# Patient Record
Sex: FEMALE | Race: BLACK OR AFRICAN AMERICAN | NOT HISPANIC OR LATINO | Employment: FULL TIME | URBAN - METROPOLITAN AREA
[De-identification: names, ages, dates, MRNs, and addresses within clinical notes are randomized per-mention and may not be internally consistent; named-entity substitution may affect disease eponyms.]

---

## 2023-06-14 ENCOUNTER — HOSPITAL ENCOUNTER (OUTPATIENT)
Dept: DATA CONVERSION | Facility: HOSPITAL | Age: 63
End: 2023-06-14
Attending: INTERNAL MEDICINE
Payer: MEDICAID

## 2023-06-14 DIAGNOSIS — K64.8 OTHER HEMORRHOIDS: ICD-10-CM

## 2023-06-14 DIAGNOSIS — Z12.11 ENCOUNTER FOR SCREENING FOR MALIGNANT NEOPLASM OF COLON: ICD-10-CM

## 2023-06-14 DIAGNOSIS — K64.4 RESIDUAL HEMORRHOIDAL SKIN TAGS: ICD-10-CM

## 2023-06-14 DIAGNOSIS — K57.30 DIVERTICULOSIS OF LARGE INTESTINE WITHOUT PERFORATION OR ABSCESS WITHOUT BLEEDING: ICD-10-CM

## 2023-06-14 DIAGNOSIS — K21.9 GASTRO-ESOPHAGEAL REFLUX DISEASE WITHOUT ESOPHAGITIS: ICD-10-CM

## 2023-06-14 DIAGNOSIS — R13.10 DYSPHAGIA, UNSPECIFIED: ICD-10-CM

## 2023-08-08 PROBLEM — M79.672 LEFT FOOT PAIN: Status: ACTIVE | Noted: 2023-08-08

## 2023-08-08 PROBLEM — R13.10 ODYNOPHAGIA: Status: ACTIVE | Noted: 2023-08-08

## 2023-08-08 PROBLEM — K59.00 CONSTIPATION: Status: ACTIVE | Noted: 2023-08-08

## 2023-08-08 PROBLEM — R13.10 DYSPHAGIA: Status: ACTIVE | Noted: 2023-08-08

## 2023-08-08 PROBLEM — M77.31 CALCANEAL SPUR OF RIGHT FOOT: Status: ACTIVE | Noted: 2023-08-08

## 2023-08-08 PROBLEM — Z95.5 PRESENCE OF STENT IN LAD CORONARY ARTERY: Status: ACTIVE | Noted: 2023-08-08

## 2023-08-08 PROBLEM — B35.4 TINEA CORPORIS: Status: ACTIVE | Noted: 2023-08-08

## 2023-08-08 PROBLEM — I25.10 CAD S/P PERCUTANEOUS CORONARY ANGIOPLASTY: Status: ACTIVE | Noted: 2023-08-08

## 2023-08-08 PROBLEM — K63.5 BENIGN COLONIC POLYP: Status: ACTIVE | Noted: 2023-08-08

## 2023-08-08 PROBLEM — E78.5 HYPERLIPIDEMIA: Status: ACTIVE | Noted: 2023-08-08

## 2023-08-08 PROBLEM — K22.4 HYPERCONTRACTILE ESOPHAGUS: Status: ACTIVE | Noted: 2023-08-08

## 2023-08-08 PROBLEM — I10 HYPERTENSION: Status: ACTIVE | Noted: 2023-08-08

## 2023-08-08 PROBLEM — M89.8X3: Status: ACTIVE | Noted: 2023-08-08

## 2023-08-08 PROBLEM — K21.9 GERD (GASTROESOPHAGEAL REFLUX DISEASE): Status: ACTIVE | Noted: 2023-08-08

## 2023-08-08 PROBLEM — Z98.61 CAD S/P PERCUTANEOUS CORONARY ANGIOPLASTY: Status: ACTIVE | Noted: 2023-08-08

## 2023-08-08 PROBLEM — R17 ELEVATED BILIRUBIN: Status: ACTIVE | Noted: 2023-08-08

## 2023-08-08 PROBLEM — N63.20 LEFT BREAST LUMP: Status: ACTIVE | Noted: 2023-08-08

## 2023-08-08 PROBLEM — Z18.81 GLASS FOREIGN BODY IN SKIN: Status: ACTIVE | Noted: 2023-08-08

## 2023-08-08 PROBLEM — R63.4 WEIGHT LOSS, UNINTENTIONAL: Status: ACTIVE | Noted: 2023-08-08

## 2023-08-08 PROBLEM — K64.9 HEMORRHOIDS: Status: ACTIVE | Noted: 2023-08-08

## 2023-08-08 PROBLEM — I25.10 CAD (CORONARY ARTERY DISEASE): Status: ACTIVE | Noted: 2023-08-08

## 2023-08-08 PROBLEM — M25.531 RIGHT WRIST PAIN: Status: ACTIVE | Noted: 2023-08-08

## 2023-08-08 PROBLEM — G57.62 MORTON'S NEUROMA OF LEFT FOOT: Status: ACTIVE | Noted: 2023-08-08

## 2023-08-08 PROBLEM — T14.8XXA GLASS FOREIGN BODY IN SKIN: Status: ACTIVE | Noted: 2023-08-08

## 2023-08-08 PROBLEM — R63.0 LOSS OF APPETITE: Status: ACTIVE | Noted: 2023-08-08

## 2023-08-08 PROBLEM — E55.9 VITAMIN D DEFICIENCY: Status: ACTIVE | Noted: 2023-08-08

## 2023-08-08 PROBLEM — G47.00 INSOMNIA: Status: ACTIVE | Noted: 2023-08-08

## 2023-08-08 PROBLEM — K64.8 INFLAMED INTERNAL HEMORRHOID: Status: ACTIVE | Noted: 2023-08-08

## 2023-08-08 PROBLEM — R20.9 COLD HANDS: Status: ACTIVE | Noted: 2023-08-08

## 2023-08-08 PROBLEM — M89.8X4: Status: ACTIVE | Noted: 2023-08-08

## 2023-08-08 RX ORDER — LANSOPRAZOLE 30 MG/1
30 CAPSULE, DELAYED RELEASE ORAL EVERY MORNING
COMMUNITY
End: 2023-08-11 | Stop reason: SDUPTHER

## 2023-08-08 RX ORDER — IBUPROFEN 800 MG/1
800 TABLET ORAL EVERY 8 HOURS PRN
COMMUNITY
Start: 2022-11-21 | End: 2023-08-11 | Stop reason: ALTCHOICE

## 2023-08-08 RX ORDER — LIDOCAINE 40 MG/G
1 CREAM TOPICAL DAILY PRN
COMMUNITY
Start: 2021-10-19 | End: 2024-01-17 | Stop reason: WASHOUT

## 2023-08-08 RX ORDER — LISINOPRIL 10 MG/1
10 TABLET ORAL DAILY
COMMUNITY
End: 2023-08-11 | Stop reason: SDUPTHER

## 2023-08-08 RX ORDER — ROSUVASTATIN CALCIUM 40 MG/1
40 TABLET, COATED ORAL DAILY
COMMUNITY
End: 2023-08-11 | Stop reason: SDUPTHER

## 2023-08-08 RX ORDER — CHOLECALCIFEROL (VITAMIN D3) 50 MCG
1 TABLET ORAL DAILY
COMMUNITY
Start: 2022-02-15 | End: 2023-08-11 | Stop reason: SDUPTHER

## 2023-08-08 RX ORDER — DOCUSATE SODIUM 100 MG/1
100 CAPSULE, LIQUID FILLED ORAL 2 TIMES DAILY
COMMUNITY
Start: 2022-12-20 | End: 2024-01-17 | Stop reason: WASHOUT

## 2023-08-08 RX ORDER — CLOPIDOGREL BISULFATE 75 MG/1
75 TABLET ORAL DAILY
COMMUNITY
End: 2023-08-11 | Stop reason: SDUPTHER

## 2023-08-08 RX ORDER — MELATONIN 10 MG
1 TABLET, SUBLINGUAL SUBLINGUAL DAILY
COMMUNITY
Start: 2023-05-10 | End: 2024-01-17 | Stop reason: WASHOUT

## 2023-08-08 RX ORDER — NITROGLYCERIN 0.4 MG/1
0.4 TABLET SUBLINGUAL EVERY 5 MIN PRN
COMMUNITY
Start: 2015-03-24

## 2023-08-08 RX ORDER — AMLODIPINE BESYLATE 2.5 MG/1
2.5 TABLET ORAL DAILY
COMMUNITY
End: 2023-08-11 | Stop reason: SDUPTHER

## 2023-08-08 RX ORDER — ASPIRIN 81 MG/1
81 TABLET ORAL DAILY
COMMUNITY
End: 2023-08-11 | Stop reason: SDUPTHER

## 2023-08-08 RX ORDER — CLOTRIMAZOLE 1 %
1 CREAM (GRAM) TOPICAL 2 TIMES DAILY
COMMUNITY
Start: 2021-06-25 | End: 2024-01-17 | Stop reason: WASHOUT

## 2023-08-08 RX ORDER — HYDROCORTISONE ACETATE PRAMOXINE HCL 2.5; 1 G/100G; G/100G
1 CREAM TOPICAL 3 TIMES DAILY PRN
COMMUNITY
Start: 2019-10-02 | End: 2023-08-11 | Stop reason: SDUPTHER

## 2023-08-11 ENCOUNTER — OFFICE VISIT (OUTPATIENT)
Dept: PRIMARY CARE | Facility: CLINIC | Age: 63
End: 2023-08-11
Payer: COMMERCIAL

## 2023-08-11 VITALS — DIASTOLIC BLOOD PRESSURE: 60 MMHG | BODY MASS INDEX: 22.86 KG/M2 | SYSTOLIC BLOOD PRESSURE: 100 MMHG | WEIGHT: 125 LBS

## 2023-08-11 DIAGNOSIS — K64.9 HEMORRHOIDS, UNSPECIFIED HEMORRHOID TYPE: ICD-10-CM

## 2023-08-11 DIAGNOSIS — Z98.61 CAD S/P PERCUTANEOUS CORONARY ANGIOPLASTY: ICD-10-CM

## 2023-08-11 DIAGNOSIS — Z00.00 ANNUAL PHYSICAL EXAM: ICD-10-CM

## 2023-08-11 DIAGNOSIS — I10 HYPERTENSION, UNSPECIFIED TYPE: ICD-10-CM

## 2023-08-11 DIAGNOSIS — E55.9 VITAMIN D DEFICIENCY: ICD-10-CM

## 2023-08-11 DIAGNOSIS — Z87.891 AGGRESSIVE EX-SMOKER: ICD-10-CM

## 2023-08-11 DIAGNOSIS — Z12.31 BREAST CANCER SCREENING BY MAMMOGRAM: ICD-10-CM

## 2023-08-11 DIAGNOSIS — M81.0 AGE-RELATED OSTEOPOROSIS WITHOUT CURRENT PATHOLOGICAL FRACTURE: ICD-10-CM

## 2023-08-11 DIAGNOSIS — K22.4 HYPERCONTRACTILE ESOPHAGUS: ICD-10-CM

## 2023-08-11 DIAGNOSIS — Z12.2 SCREENING FOR LUNG CANCER: ICD-10-CM

## 2023-08-11 DIAGNOSIS — E78.5 HYPERLIPIDEMIA, UNSPECIFIED HYPERLIPIDEMIA TYPE: Primary | ICD-10-CM

## 2023-08-11 DIAGNOSIS — Z13.820 OSTEOPOROSIS SCREENING: ICD-10-CM

## 2023-08-11 DIAGNOSIS — I25.10 CAD S/P PERCUTANEOUS CORONARY ANGIOPLASTY: ICD-10-CM

## 2023-08-11 PROCEDURE — 1159F MED LIST DOCD IN RCRD: CPT | Performed by: INTERNAL MEDICINE

## 2023-08-11 PROCEDURE — 1126F AMNT PAIN NOTED NONE PRSNT: CPT | Performed by: INTERNAL MEDICINE

## 2023-08-11 PROCEDURE — G0439 PPPS, SUBSEQ VISIT: HCPCS | Performed by: INTERNAL MEDICINE

## 2023-08-11 PROCEDURE — 1170F FXNL STATUS ASSESSED: CPT | Performed by: INTERNAL MEDICINE

## 2023-08-11 PROCEDURE — 3078F DIAST BP <80 MM HG: CPT | Performed by: INTERNAL MEDICINE

## 2023-08-11 PROCEDURE — 1160F RVW MEDS BY RX/DR IN RCRD: CPT | Performed by: INTERNAL MEDICINE

## 2023-08-11 PROCEDURE — 1036F TOBACCO NON-USER: CPT | Performed by: INTERNAL MEDICINE

## 2023-08-11 PROCEDURE — 99214 OFFICE O/P EST MOD 30 MIN: CPT | Performed by: INTERNAL MEDICINE

## 2023-08-11 PROCEDURE — 3074F SYST BP LT 130 MM HG: CPT | Performed by: INTERNAL MEDICINE

## 2023-08-11 RX ORDER — LANSOPRAZOLE 30 MG/1
30 CAPSULE, DELAYED RELEASE ORAL EVERY MORNING
Qty: 90 CAPSULE | Refills: 1 | Status: SHIPPED | OUTPATIENT
Start: 2023-08-11 | End: 2023-11-29 | Stop reason: SDUPTHER

## 2023-08-11 RX ORDER — CLOPIDOGREL BISULFATE 75 MG/1
75 TABLET ORAL DAILY
Qty: 90 TABLET | Refills: 1 | Status: SHIPPED | OUTPATIENT
Start: 2023-08-11 | End: 2023-11-29 | Stop reason: SDUPTHER

## 2023-08-11 RX ORDER — CHOLECALCIFEROL (VITAMIN D3) 50 MCG
50 TABLET ORAL DAILY
Qty: 90 TABLET | Refills: 1 | Status: SHIPPED | OUTPATIENT
Start: 2023-08-11 | End: 2023-11-29 | Stop reason: SDUPTHER

## 2023-08-11 RX ORDER — HYDROCORTISONE ACETATE PRAMOXINE HCL 2.5; 1 G/100G; G/100G
1 CREAM TOPICAL 3 TIMES DAILY PRN
Qty: 1 G | Refills: 1 | Status: SHIPPED | OUTPATIENT
Start: 2023-08-11 | End: 2023-08-14 | Stop reason: SDUPTHER

## 2023-08-11 RX ORDER — ROSUVASTATIN CALCIUM 40 MG/1
40 TABLET, COATED ORAL DAILY
Qty: 90 TABLET | Refills: 1 | Status: SHIPPED | OUTPATIENT
Start: 2023-08-11 | End: 2023-11-29 | Stop reason: SDUPTHER

## 2023-08-11 RX ORDER — AMLODIPINE BESYLATE 2.5 MG/1
2.5 TABLET ORAL DAILY
Qty: 90 TABLET | Refills: 1 | Status: SHIPPED | OUTPATIENT
Start: 2023-08-11 | End: 2023-11-29 | Stop reason: SDUPTHER

## 2023-08-11 RX ORDER — ASPIRIN 81 MG/1
81 TABLET ORAL DAILY
Qty: 90 TABLET | Refills: 1 | Status: SHIPPED | OUTPATIENT
Start: 2023-08-11 | End: 2024-01-17 | Stop reason: SDUPTHER

## 2023-08-11 RX ORDER — LISINOPRIL 10 MG/1
10 TABLET ORAL DAILY
Qty: 90 TABLET | Refills: 1 | Status: SHIPPED | OUTPATIENT
Start: 2023-08-11 | End: 2023-11-29 | Stop reason: SDUPTHER

## 2023-08-11 ASSESSMENT — ENCOUNTER SYMPTOMS
APNEA: 0
ABDOMINAL DISTENTION: 0
ACTIVITY CHANGE: 0
FLANK PAIN: 0
HEADACHES: 0
WOUND: 0
DEPRESSION: 0
DIAPHORESIS: 0
WEAKNESS: 0
TREMORS: 0
SLEEP DISTURBANCE: 0
BLOOD IN STOOL: 0
NECK PAIN: 0
LOSS OF SENSATION IN FEET: 0
NAUSEA: 0
CONSTIPATION: 0
OCCASIONAL FEELINGS OF UNSTEADINESS: 0
CHILLS: 0
FATIGUE: 0
PALPITATIONS: 0
SHORTNESS OF BREATH: 0
APPETITE CHANGE: 0
UNEXPECTED WEIGHT CHANGE: 0
BACK PAIN: 0
WHEEZING: 0
VOMITING: 0
DYSURIA: 0
FEVER: 0
COUGH: 0
MYALGIAS: 0
ARTHRALGIAS: 0
JOINT SWELLING: 0
FREQUENCY: 0
NECK STIFFNESS: 0
HEMATURIA: 0
DIARRHEA: 0
ABDOMINAL PAIN: 0
DIZZINESS: 0
DIFFICULTY URINATING: 0
CHEST TIGHTNESS: 0
NERVOUS/ANXIOUS: 0

## 2023-08-11 ASSESSMENT — ACTIVITIES OF DAILY LIVING (ADL)
GROCERY_SHOPPING: INDEPENDENT
MANAGING_FINANCES: INDEPENDENT
DOING_HOUSEWORK: INDEPENDENT
TAKING_MEDICATION: INDEPENDENT
BATHING: INDEPENDENT
DRESSING: INDEPENDENT

## 2023-08-11 ASSESSMENT — PATIENT HEALTH QUESTIONNAIRE - PHQ9
1. LITTLE INTEREST OR PLEASURE IN DOING THINGS: NOT AT ALL
1. LITTLE INTEREST OR PLEASURE IN DOING THINGS: NOT AT ALL
2. FEELING DOWN, DEPRESSED OR HOPELESS: NOT AT ALL
2. FEELING DOWN, DEPRESSED OR HOPELESS: NOT AT ALL
SUM OF ALL RESPONSES TO PHQ9 QUESTIONS 1 AND 2: 0
2. FEELING DOWN, DEPRESSED OR HOPELESS: NOT AT ALL
1. LITTLE INTEREST OR PLEASURE IN DOING THINGS: NOT AT ALL

## 2023-08-11 NOTE — PROGRESS NOTES
Subjective   Patient ID: Marquise Gaona is a 63 y.o. female who presents for Follow-up and Medicare Annual Wellness Visit Subsequent.  Marquise Gaona, a 64 y/o with PMHx of HTN, HLD, CAD s/p angioplasty, Hypercontractile esophagus, Ospina's neuroma of the left foot excision and insomnia came to clinic for a wellness visit. Patient feels well overall. She denies any new concerns or complaints. She follows with Cardiology yearly. She is retired, no problems with sleep or appetite. Feels safe at home. No recent fall. Able to take her medication as prescribed. No fever, chills, recent illness, headache, chest pain, SOB, N/V diarrhea, melan hematochezia, urinary symptoms, hematuria, or leg swelling.         Review of Systems   Constitutional:  Negative for activity change, appetite change, chills, diaphoresis, fatigue, fever and unexpected weight change.   Eyes:  Negative for visual disturbance.   Respiratory:  Negative for apnea, cough, chest tightness, shortness of breath and wheezing.    Cardiovascular:  Negative for chest pain, palpitations and leg swelling.   Gastrointestinal:  Negative for abdominal distention, abdominal pain, blood in stool, constipation, diarrhea, nausea and vomiting.   Endocrine: Negative for cold intolerance and heat intolerance.   Genitourinary:  Negative for decreased urine volume, difficulty urinating, dysuria, enuresis, flank pain, frequency, hematuria and urgency.   Musculoskeletal:  Negative for arthralgias, back pain, gait problem, joint swelling, myalgias, neck pain and neck stiffness.   Skin:  Negative for rash and wound.   Neurological:  Negative for dizziness, tremors, syncope, weakness and headaches.   Psychiatric/Behavioral:  Negative for behavioral problems and sleep disturbance. The patient is not nervous/anxious.        Objective   Physical Exam  Constitutional:       General: She is not in acute distress.     Appearance: She is not ill-appearing, toxic-appearing or diaphoretic.    HENT:      Head: Normocephalic and atraumatic.      Right Ear: There is no impacted cerumen.      Left Ear: There is no impacted cerumen.      Nose: Nose normal. No congestion or rhinorrhea.      Mouth/Throat:      Mouth: Mucous membranes are moist.      Pharynx: No oropharyngeal exudate or posterior oropharyngeal erythema.   Eyes:      General:         Right eye: No discharge.         Left eye: No discharge.      Extraocular Movements: Extraocular movements intact.      Conjunctiva/sclera: Conjunctivae normal.      Pupils: Pupils are equal, round, and reactive to light.   Neck:      Vascular: No carotid bruit.   Cardiovascular:      Rate and Rhythm: Normal rate and regular rhythm.      Heart sounds: No murmur heard.     No friction rub. No gallop.   Pulmonary:      Effort: No respiratory distress.      Breath sounds: No stridor. No wheezing, rhonchi or rales.   Chest:      Chest wall: No tenderness.   Abdominal:      General: Abdomen is flat. Bowel sounds are normal. There is no distension.      Palpations: Abdomen is soft. There is no mass.      Tenderness: There is no abdominal tenderness. There is no guarding or rebound.      Hernia: No hernia is present.   Musculoskeletal:         General: No swelling, tenderness, deformity or signs of injury. Normal range of motion.      Cervical back: Normal range of motion and neck supple. No rigidity or tenderness.      Right lower leg: No edema.      Left lower leg: No edema.   Lymphadenopathy:      Cervical: No cervical adenopathy.   Skin:     Coloration: Skin is not jaundiced or pale.      Findings: No bruising, erythema, lesion or rash.   Neurological:      General: No focal deficit present.      Mental Status: She is oriented to person, place, and time. Mental status is at baseline.      Cranial Nerves: No cranial nerve deficit.      Sensory: No sensory deficit.      Motor: No weakness.      Coordination: Coordination normal.      Gait: Gait normal.      Deep Tendon  Reflexes: Reflexes normal.   Psychiatric:         Mood and Affect: Mood normal.         Behavior: Behavior normal.         Thought Content: Thought content normal.         Judgment: Judgment normal.         Assessment/Plan   Problem List Items Addressed This Visit          Cardiac and Vasculature    CAD S/P percutaneous coronary angioplasty    Relevant Medications    amLODIPine (Norvasc) 2.5 mg tablet    aspirin 81 mg EC tablet    clopidogrel (Plavix) 75 mg tablet    Other Relevant Orders    Lipid Panel    Hyperlipidemia - Primary    Relevant Medications    rosuvastatin (Crestor) 40 mg tablet    Other Relevant Orders    Lipid Panel    Hypertension    Relevant Medications    amLODIPine (Norvasc) 2.5 mg tablet    lisinopril 10 mg tablet    Other Relevant Orders    Albumin , Urine Random    CBC    Comprehensive Metabolic Panel       Endocrine/Metabolic    Vitamin D deficiency    Relevant Medications    cholecalciferol (Vitamin D-3) 50 MCG (2000 UT) tablet    Other Relevant Orders    XR DEXA bone density       Gastrointestinal and Abdominal    Hypercontractile esophagus    Relevant Medications    lansoprazole (Prevacid) 30 mg DR capsule    Hemorrhoids    Relevant Medications    hydrocortisone-pramoxine (Analpram-HC) 2.5-1 % cream    Other Relevant Orders    CBC     Other Visit Diagnoses       Breast cancer screening by mammogram        Relevant Orders    BI mammo bilateral screening tomosynthesis    Osteoporosis screening        Relevant Orders    XR DEXA bone density    Annual physical exam        Relevant Orders    CBC    Comprehensive Metabolic Panel    Hemoglobin A1C    TSH with reflex to Free T4 if abnormal    XR DEXA bone density    CT lung screening low dose    Age-related osteoporosis without current pathological fracture        Relevant Orders    XR DEXA bone density    Screening for lung cancer        Relevant Orders    CT lung screening low dose    Aggressive ex-smoker        Relevant Orders    CT lung  screening low dose             Medicare Wellness Billing Compliance Satisfied    *This is a visual tool to show completion of required items on the day of the visit. Green checks will only appear on the date of visit.    Review all medications by prescribing practitioner or clinical pharmacist (such as prescriptions, OTCs, herbal therapies and supplements) documented in the medical record    Past Medical, Surgical, and Family History reviewed and updated in chart    Tobacco Use Reviewed    Alcohol Use Reviewed    Illicit Drug Use Reviewed    PHQ2/9    Falls in Last Year Reviewed    Home Safety Risk Factors Reviewed    Cognitive Impairment Reviewed    Patient Self Assessment and Health Status    Current Diet Reviewed    Exercise Frequency    ADL - Hearing Impairment    ADL - Bathing    ADL - Dressing    ADL - Walks in Home    IADL - Managing Finances    IADL - Grocery Shopping    IADL - Taking Medications    IADL - Doing Housework        Marquise Gaona, a 62 y/o with PMHx of HTN, HLD, CAD s/p angioplasty, Hypercontractile esophagus, Ospina's neuroma of the left foot excision and insomnia came to clinic for a wellness visit. Patient feels well overall. She denies any new concerns or complaints. She follows with Cardiology yearly. She is retired, no problems with sleep or appetite. Feels safe at home. No recent fall. Able to take her medication as prescribed. No fever, chills, recent illness, headache, chest pain, SOB, N/V diarrhea, melan hematochezia, urinary symptoms, hematuria, or leg swelling.     #HTN   -/60   -Continue amlodipine and lisinopril     #HLD   #CAD s/p angioplasty   -Continue statin, aspirin, and plavix   -Follows with Dr. Madrigal cardiologist     #Hypercontractile esophagus   -Continue Lansoprazole   -EGD completed 2023 no acute findings     #Insomnia   -Continue melatonin     #Vitamin D def  -Continue Vitamin D     #Hx of smoking  -quit in 2015, smoked 1 PPD>30  years  -Ordered Low dose CT lung for screening       Health maintenance   -Screen:   -Mammo due now   -Dexa due now   -PAP Not indicated; completed previously  -Low dose CT, Hx of smoking   -Colonoscopy UTD repeat in 2033  -Immunization: Discussed and reviewed     Complete lab work today   Follow up in 3months

## 2023-08-11 NOTE — PROGRESS NOTES
I reviewed with the resident the medical history and the resident’s findings on physical examination.  I discussed with the resident the patient’s diagnosis and concur with the treatment plan as documented in the resident note.     I saw and evaluated the patient. I personally obtained the key and critical portions of the history and physical exam or was physically present for key and critical portions performed by the trainee. I reviewed the trainee's documentation and discussed the patient with the trainee. I agree with the trainee's medical decision making, as documented on the trainee's note.     Manuela No MD

## 2023-08-14 DIAGNOSIS — K64.9 HEMORRHOIDS, UNSPECIFIED HEMORRHOID TYPE: ICD-10-CM

## 2023-08-14 RX ORDER — HYDROCORTISONE ACETATE PRAMOXINE HCL 2.5; 1 G/100G; G/100G
1 CREAM TOPICAL 3 TIMES DAILY PRN
Qty: 1 G | Refills: 1 | Status: SHIPPED | OUTPATIENT
Start: 2023-08-14 | End: 2024-01-17 | Stop reason: WASHOUT

## 2023-08-19 ENCOUNTER — LAB (OUTPATIENT)
Dept: LAB | Facility: LAB | Age: 63
End: 2023-08-19
Payer: COMMERCIAL

## 2023-08-19 DIAGNOSIS — Z98.61 CAD S/P PERCUTANEOUS CORONARY ANGIOPLASTY: ICD-10-CM

## 2023-08-19 DIAGNOSIS — I25.10 CAD S/P PERCUTANEOUS CORONARY ANGIOPLASTY: ICD-10-CM

## 2023-08-19 DIAGNOSIS — I10 HYPERTENSION, UNSPECIFIED TYPE: ICD-10-CM

## 2023-08-19 DIAGNOSIS — Z00.00 ANNUAL PHYSICAL EXAM: ICD-10-CM

## 2023-08-19 DIAGNOSIS — K64.9 HEMORRHOIDS, UNSPECIFIED HEMORRHOID TYPE: ICD-10-CM

## 2023-08-19 DIAGNOSIS — E78.5 HYPERLIPIDEMIA, UNSPECIFIED HYPERLIPIDEMIA TYPE: ICD-10-CM

## 2023-08-19 LAB
ALANINE AMINOTRANSFERASE (SGPT) (U/L) IN SER/PLAS: 29 U/L (ref 7–45)
ALBUMIN (G/DL) IN SER/PLAS: 4.6 G/DL (ref 3.4–5)
ALBUMIN (MG/L) IN URINE: 11.6 MG/L
ALBUMIN/CREATININE (UG/MG) IN URINE: 6 UG/MG CRT (ref 0–30)
ALKALINE PHOSPHATASE (U/L) IN SER/PLAS: 82 U/L (ref 33–136)
ANION GAP IN SER/PLAS: 16 MMOL/L (ref 10–20)
ASPARTATE AMINOTRANSFERASE (SGOT) (U/L) IN SER/PLAS: 26 U/L (ref 9–39)
BILIRUBIN TOTAL (MG/DL) IN SER/PLAS: 0.7 MG/DL (ref 0–1.2)
CALCIUM (MG/DL) IN SER/PLAS: 10.2 MG/DL (ref 8.6–10.6)
CARBON DIOXIDE, TOTAL (MMOL/L) IN SER/PLAS: 25 MMOL/L (ref 21–32)
CHLORIDE (MMOL/L) IN SER/PLAS: 103 MMOL/L (ref 98–107)
CHOLESTEROL (MG/DL) IN SER/PLAS: 178 MG/DL (ref 0–199)
CHOLESTEROL IN HDL (MG/DL) IN SER/PLAS: 68.7 MG/DL
CHOLESTEROL/HDL RATIO: 2.6
CREATININE (MG/DL) IN SER/PLAS: 0.7 MG/DL (ref 0.5–1.05)
CREATININE (MG/DL) IN URINE: 192 MG/DL (ref 20–320)
ERYTHROCYTE DISTRIBUTION WIDTH (RATIO) BY AUTOMATED COUNT: 15.1 % (ref 11.5–14.5)
ERYTHROCYTE MEAN CORPUSCULAR HEMOGLOBIN CONCENTRATION (G/DL) BY AUTOMATED: 31.3 G/DL (ref 32–36)
ERYTHROCYTE MEAN CORPUSCULAR VOLUME (FL) BY AUTOMATED COUNT: 87 FL (ref 80–100)
ERYTHROCYTES (10*6/UL) IN BLOOD BY AUTOMATED COUNT: 5.17 X10E12/L (ref 4–5.2)
ESTIMATED AVERAGE GLUCOSE FOR HBA1C: 126 MG/DL
GFR FEMALE: >90 ML/MIN/1.73M2
GLUCOSE (MG/DL) IN SER/PLAS: 100 MG/DL (ref 74–99)
HEMATOCRIT (%) IN BLOOD BY AUTOMATED COUNT: 45.1 % (ref 36–46)
HEMOGLOBIN (G/DL) IN BLOOD: 14.1 G/DL (ref 12–16)
HEMOGLOBIN A1C/HEMOGLOBIN TOTAL IN BLOOD: 6 %
LDL: 98 MG/DL (ref 0–99)
LEUKOCYTES (10*3/UL) IN BLOOD BY AUTOMATED COUNT: 9.5 X10E9/L (ref 4.4–11.3)
NRBC (PER 100 WBCS) BY AUTOMATED COUNT: 0 /100 WBC (ref 0–0)
PLATELETS (10*3/UL) IN BLOOD AUTOMATED COUNT: 320 X10E9/L (ref 150–450)
POTASSIUM (MMOL/L) IN SER/PLAS: 4.7 MMOL/L (ref 3.5–5.3)
PROTEIN TOTAL: 7.6 G/DL (ref 6.4–8.2)
SODIUM (MMOL/L) IN SER/PLAS: 139 MMOL/L (ref 136–145)
THYROTROPIN (MIU/L) IN SER/PLAS BY DETECTION LIMIT <= 0.05 MIU/L: 1.63 MIU/L (ref 0.44–3.98)
TRIGLYCERIDE (MG/DL) IN SER/PLAS: 57 MG/DL (ref 0–149)
UREA NITROGEN (MG/DL) IN SER/PLAS: 10 MG/DL (ref 6–23)
VLDL: 11 MG/DL (ref 0–40)

## 2023-08-19 PROCEDURE — 82570 ASSAY OF URINE CREATININE: CPT

## 2023-08-19 PROCEDURE — 84443 ASSAY THYROID STIM HORMONE: CPT

## 2023-08-19 PROCEDURE — 36415 COLL VENOUS BLD VENIPUNCTURE: CPT

## 2023-08-19 PROCEDURE — 85027 COMPLETE CBC AUTOMATED: CPT

## 2023-08-19 PROCEDURE — 80053 COMPREHEN METABOLIC PANEL: CPT

## 2023-08-19 PROCEDURE — 80061 LIPID PANEL: CPT

## 2023-08-19 PROCEDURE — 82043 UR ALBUMIN QUANTITATIVE: CPT

## 2023-08-19 PROCEDURE — 83036 HEMOGLOBIN GLYCOSYLATED A1C: CPT

## 2023-08-20 NOTE — RESULT ENCOUNTER NOTE
I reviewed your results.  Everything looks good.  Please continue with current treatment.  Best,  Dr. Roy

## 2023-11-28 ENCOUNTER — APPOINTMENT (OUTPATIENT)
Dept: RADIOLOGY | Facility: CLINIC | Age: 63
End: 2023-11-28
Payer: COMMERCIAL

## 2023-11-29 ENCOUNTER — OFFICE VISIT (OUTPATIENT)
Dept: PRIMARY CARE | Facility: CLINIC | Age: 63
End: 2023-11-29
Payer: COMMERCIAL

## 2023-11-29 VITALS
WEIGHT: 126 LBS | HEART RATE: 102 BPM | BODY MASS INDEX: 23.05 KG/M2 | SYSTOLIC BLOOD PRESSURE: 137 MMHG | DIASTOLIC BLOOD PRESSURE: 85 MMHG

## 2023-11-29 DIAGNOSIS — Z13.820 ENCOUNTER FOR OSTEOPOROSIS SCREENING IN ASYMPTOMATIC POSTMENOPAUSAL PATIENT: ICD-10-CM

## 2023-11-29 DIAGNOSIS — K22.4 HYPERCONTRACTILE ESOPHAGUS: ICD-10-CM

## 2023-11-29 DIAGNOSIS — I25.10 CAD S/P PERCUTANEOUS CORONARY ANGIOPLASTY: ICD-10-CM

## 2023-11-29 DIAGNOSIS — E55.9 VITAMIN D DEFICIENCY: ICD-10-CM

## 2023-11-29 DIAGNOSIS — E78.5 HYPERLIPIDEMIA, UNSPECIFIED HYPERLIPIDEMIA TYPE: ICD-10-CM

## 2023-11-29 DIAGNOSIS — Z00.00 HEALTHCARE MAINTENANCE: Primary | ICD-10-CM

## 2023-11-29 DIAGNOSIS — Z98.61 CAD S/P PERCUTANEOUS CORONARY ANGIOPLASTY: ICD-10-CM

## 2023-11-29 DIAGNOSIS — I10 HYPERTENSION, UNSPECIFIED TYPE: ICD-10-CM

## 2023-11-29 DIAGNOSIS — Z23 FLU VACCINE NEED: ICD-10-CM

## 2023-11-29 DIAGNOSIS — Z78.0 ENCOUNTER FOR OSTEOPOROSIS SCREENING IN ASYMPTOMATIC POSTMENOPAUSAL PATIENT: ICD-10-CM

## 2023-11-29 PROCEDURE — 1126F AMNT PAIN NOTED NONE PRSNT: CPT | Performed by: INTERNAL MEDICINE

## 2023-11-29 PROCEDURE — 1159F MED LIST DOCD IN RCRD: CPT | Performed by: INTERNAL MEDICINE

## 2023-11-29 PROCEDURE — 90662 IIV NO PRSV INCREASED AG IM: CPT | Performed by: INTERNAL MEDICINE

## 2023-11-29 PROCEDURE — 1036F TOBACCO NON-USER: CPT | Performed by: INTERNAL MEDICINE

## 2023-11-29 PROCEDURE — 3075F SYST BP GE 130 - 139MM HG: CPT | Performed by: INTERNAL MEDICINE

## 2023-11-29 PROCEDURE — 3079F DIAST BP 80-89 MM HG: CPT | Performed by: INTERNAL MEDICINE

## 2023-11-29 PROCEDURE — 1160F RVW MEDS BY RX/DR IN RCRD: CPT | Performed by: INTERNAL MEDICINE

## 2023-11-29 PROCEDURE — 99214 OFFICE O/P EST MOD 30 MIN: CPT | Performed by: INTERNAL MEDICINE

## 2023-11-29 PROCEDURE — G0008 ADMIN INFLUENZA VIRUS VAC: HCPCS | Performed by: INTERNAL MEDICINE

## 2023-11-29 RX ORDER — CLOPIDOGREL BISULFATE 75 MG/1
75 TABLET ORAL DAILY
Qty: 90 TABLET | Refills: 1 | Status: SHIPPED | OUTPATIENT
Start: 2023-11-29

## 2023-11-29 RX ORDER — ROSUVASTATIN CALCIUM 40 MG/1
40 TABLET, COATED ORAL DAILY
Qty: 90 TABLET | Refills: 1 | Status: SHIPPED | OUTPATIENT
Start: 2023-11-29 | End: 2024-01-17 | Stop reason: SDUPTHER

## 2023-11-29 RX ORDER — CHOLECALCIFEROL (VITAMIN D3) 50 MCG
50 TABLET ORAL DAILY
Qty: 90 TABLET | Refills: 1 | Status: SHIPPED | OUTPATIENT
Start: 2023-11-29 | End: 2024-01-17 | Stop reason: SDUPTHER

## 2023-11-29 RX ORDER — LISINOPRIL 10 MG/1
10 TABLET ORAL DAILY
Qty: 90 TABLET | Refills: 1 | Status: SHIPPED | OUTPATIENT
Start: 2023-11-29 | End: 2024-01-17 | Stop reason: SDUPTHER

## 2023-11-29 RX ORDER — AMLODIPINE BESYLATE 2.5 MG/1
2.5 TABLET ORAL DAILY
Qty: 90 TABLET | Refills: 1 | Status: SHIPPED | OUTPATIENT
Start: 2023-11-29 | End: 2024-01-17 | Stop reason: SDUPTHER

## 2023-11-29 RX ORDER — LANSOPRAZOLE 30 MG/1
30 CAPSULE, DELAYED RELEASE ORAL EVERY MORNING
Qty: 90 CAPSULE | Refills: 1 | Status: SHIPPED | OUTPATIENT
Start: 2023-11-29 | End: 2024-01-17 | Stop reason: SDUPTHER

## 2023-11-29 ASSESSMENT — ENCOUNTER SYMPTOMS
COUGH: 0
SYNCOPE: 0
DIZZINESS: 0
DIARRHEA: 0
ABDOMINAL PAIN: 0
VOMITING: 0
CHILLS: 0
DEPRESSION: 0
ALTERED MENTAL STATUS: 0
HEADACHES: 0
SHORTNESS OF BREATH: 0
NAUSEA: 0
CONSTIPATION: 0
FEVER: 0

## 2023-11-29 ASSESSMENT — PATIENT HEALTH QUESTIONNAIRE - PHQ9
2. FEELING DOWN, DEPRESSED OR HOPELESS: NOT AT ALL
1. LITTLE INTEREST OR PLEASURE IN DOING THINGS: NOT AT ALL
SUM OF ALL RESPONSES TO PHQ9 QUESTIONS 1 AND 2: 0

## 2023-11-29 NOTE — PROGRESS NOTES
I saw and evaluated the patient. I personally obtained the key and critical portions of the history and physical exam or was physically present for key and critical portions performed by the resident/fellow. I reviewed the resident/fellow's documentation and discussed the patient with the resident/fellow. I agree with the resident/fellow's medical decision making as documented in the note.    Manuela No MD

## 2023-11-29 NOTE — PROGRESS NOTES
Subjective   Marquise Gaona is a 63 y.o. female.    Chief Complaint:  No chief complaint on file.    Med Refill  Pertinent negatives include no abdominal pain, chest pain, chills, coughing, fever, headaches, nausea or vomiting.     Marquise Gaona, a 64 y/o with PMHx of HTN, HLD, CAD s/p angioplasty, Hypercontractile esophagus, Ospina's neuroma of the left foot excision and insomnia came to clinic for a follow up visit. She has no acute concerns or complaints today. She denies any SOB, cough, n/v, CP, fevers, chills, headaches, dizziness, n/v, or abdominal pain. She would like her medications refilled today.     Review of Systems   Constitutional: Negative for chills and fever.   Cardiovascular:  Negative for chest pain, leg swelling and syncope.   Respiratory:  Negative for cough and shortness of breath.    Gastrointestinal:  Negative for abdominal pain, constipation, diarrhea, nausea and vomiting.   Neurological:  Negative for dizziness and headaches.   Psychiatric/Behavioral:  Negative for altered mental status and depression.    All other systems reviewed and are negative.      Objective   Constitutional:       Appearance: Healthy appearance. Not in distress.   Eyes:      Conjunctiva/sclera: Conjunctivae normal.   Pulmonary:      Effort: Pulmonary effort is normal.      Breath sounds: Normal breath sounds.   Cardiovascular:      Normal rate. Regular rhythm.      Murmurs: There is no murmur.   Pulses:     Intact distal pulses.   Edema:     Peripheral edema absent.   Abdominal:      General: Bowel sounds are normal.      Palpations: Abdomen is soft.   Musculoskeletal: Normal range of motion.      Cervical back: Normal range of motion. Skin:     General: Skin is warm and dry.   Neurological:      Mental Status: Alert and oriented to person, place and time.       Assessment/Plan   There were no encounter diagnoses.    #HTN   -BP stable  -Continue amlodipine and lisinopril, meds refilled     #HLD   #CAD s/p  angioplasty   -LDL improved to 98  -Continue statin, aspirin, and plavix, meds refilled  -Follows with Dr. Madrigal cardiologist      #Hypercontractile esophagus   -Continue Lansoprazole, refilled today  -EGD completed 2023 no acute findings      #Insomnia   -Continue melatonin      #Vitamin D def  -Continue Vitamin D      #Hx of smoking  -quit in 2015, smoked 1 PPD>30 years  - Low dose CT showed stable lung nodule, will repeat in 1 year        Health maintenance   -Screen:   -Mammo done 2023 unremarkable.   -Dexa due- re-ordered today  -PAP Not indicated; completed previously  -Low dose CT in 1 year  -Colonoscopy UTD repeat in 2033  -Immunization: Discussed and reviewed   -Follow up in 3 months

## 2024-01-17 ENCOUNTER — OFFICE VISIT (OUTPATIENT)
Dept: PRIMARY CARE | Facility: CLINIC | Age: 64
End: 2024-01-17
Payer: COMMERCIAL

## 2024-01-17 VITALS — BODY MASS INDEX: 24.51 KG/M2 | SYSTOLIC BLOOD PRESSURE: 112 MMHG | DIASTOLIC BLOOD PRESSURE: 80 MMHG | WEIGHT: 134 LBS

## 2024-01-17 DIAGNOSIS — I10 HYPERTENSION, UNSPECIFIED TYPE: ICD-10-CM

## 2024-01-17 DIAGNOSIS — E78.5 HYPERLIPIDEMIA, UNSPECIFIED HYPERLIPIDEMIA TYPE: ICD-10-CM

## 2024-01-17 DIAGNOSIS — K22.4 HYPERCONTRACTILE ESOPHAGUS: ICD-10-CM

## 2024-01-17 DIAGNOSIS — Z98.61 CAD S/P PERCUTANEOUS CORONARY ANGIOPLASTY: ICD-10-CM

## 2024-01-17 DIAGNOSIS — E55.9 VITAMIN D DEFICIENCY: ICD-10-CM

## 2024-01-17 DIAGNOSIS — I25.10 CAD S/P PERCUTANEOUS CORONARY ANGIOPLASTY: ICD-10-CM

## 2024-01-17 PROCEDURE — 1036F TOBACCO NON-USER: CPT | Performed by: INTERNAL MEDICINE

## 2024-01-17 PROCEDURE — 1160F RVW MEDS BY RX/DR IN RCRD: CPT | Performed by: INTERNAL MEDICINE

## 2024-01-17 PROCEDURE — 3079F DIAST BP 80-89 MM HG: CPT | Performed by: INTERNAL MEDICINE

## 2024-01-17 PROCEDURE — 1159F MED LIST DOCD IN RCRD: CPT | Performed by: INTERNAL MEDICINE

## 2024-01-17 PROCEDURE — 1170F FXNL STATUS ASSESSED: CPT | Performed by: INTERNAL MEDICINE

## 2024-01-17 PROCEDURE — G0439 PPPS, SUBSEQ VISIT: HCPCS | Performed by: INTERNAL MEDICINE

## 2024-01-17 PROCEDURE — 3074F SYST BP LT 130 MM HG: CPT | Performed by: INTERNAL MEDICINE

## 2024-01-17 PROCEDURE — 1126F AMNT PAIN NOTED NONE PRSNT: CPT | Performed by: INTERNAL MEDICINE

## 2024-01-17 PROCEDURE — 99213 OFFICE O/P EST LOW 20 MIN: CPT | Performed by: INTERNAL MEDICINE

## 2024-01-17 RX ORDER — CHOLECALCIFEROL (VITAMIN D3) 50 MCG
50 TABLET ORAL DAILY
Qty: 90 TABLET | Refills: 1 | Status: SHIPPED | OUTPATIENT
Start: 2024-01-17

## 2024-01-17 RX ORDER — ROSUVASTATIN CALCIUM 40 MG/1
40 TABLET, COATED ORAL DAILY
Qty: 90 TABLET | Refills: 1 | Status: SHIPPED | OUTPATIENT
Start: 2024-01-17

## 2024-01-17 RX ORDER — LISINOPRIL 10 MG/1
10 TABLET ORAL DAILY
Qty: 90 TABLET | Refills: 1 | Status: SHIPPED | OUTPATIENT
Start: 2024-01-17

## 2024-01-17 RX ORDER — ASPIRIN 81 MG/1
81 TABLET ORAL DAILY
Qty: 90 TABLET | Refills: 1 | Status: SHIPPED | OUTPATIENT
Start: 2024-01-17

## 2024-01-17 RX ORDER — LANSOPRAZOLE 30 MG/1
30 CAPSULE, DELAYED RELEASE ORAL EVERY MORNING
Qty: 90 CAPSULE | Refills: 1 | Status: SHIPPED | OUTPATIENT
Start: 2024-01-17

## 2024-01-17 RX ORDER — AMLODIPINE BESYLATE 2.5 MG/1
2.5 TABLET ORAL DAILY
Qty: 90 TABLET | Refills: 1 | Status: SHIPPED | OUTPATIENT
Start: 2024-01-17 | End: 2024-04-01

## 2024-01-17 ASSESSMENT — PATIENT HEALTH QUESTIONNAIRE - PHQ9
SUM OF ALL RESPONSES TO PHQ9 QUESTIONS 1 AND 2: 0
1. LITTLE INTEREST OR PLEASURE IN DOING THINGS: NOT AT ALL
1. LITTLE INTEREST OR PLEASURE IN DOING THINGS: NOT AT ALL
2. FEELING DOWN, DEPRESSED OR HOPELESS: NOT AT ALL
SUM OF ALL RESPONSES TO PHQ9 QUESTIONS 1 AND 2: 0
2. FEELING DOWN, DEPRESSED OR HOPELESS: NOT AT ALL

## 2024-01-17 ASSESSMENT — ENCOUNTER SYMPTOMS
DEPRESSION: 0
LOSS OF SENSATION IN FEET: 0
OCCASIONAL FEELINGS OF UNSTEADINESS: 0

## 2024-01-17 ASSESSMENT — ACTIVITIES OF DAILY LIVING (ADL)
DOING_HOUSEWORK: INDEPENDENT
MANAGING_FINANCES: INDEPENDENT
DRESSING: INDEPENDENT
TAKING_MEDICATION: INDEPENDENT
GROCERY_SHOPPING: INDEPENDENT
BATHING: INDEPENDENT

## 2024-01-17 NOTE — PROGRESS NOTES
Subjective   Marquise Gaona is a 63 y.o. female.    Chief Complaint:  Follow-up, Medicare Annual Wellness Visit Subsequent, and Med Refill    Med Refill      Marquise Gaona, a 64 y/o with PMHx of HTN, HLD, CAD s/p angioplasty, Hypercontractile esophagus, Ospina's neuroma of the left foot excision and insomnia came to clinic for her wellness visit. She states she is doing well today and has no acute concerns or complaints. She denies any CP, SOB, cough, n/v, f/c, headaches, dizziness, abdominal pain or LOC.    ROS  12 point ROS otherwise negative.     Objective   Vitals reviewed.   Constitutional:       Appearance: Healthy appearance. Not in distress.   Eyes:      Conjunctiva/sclera: Conjunctivae normal.   Pulmonary:      Effort: Pulmonary effort is normal.      Breath sounds: Normal breath sounds.   Cardiovascular:      Normal rate. Regular rhythm.      Murmurs: There is no murmur.   Pulses:     Intact distal pulses.   Edema:     Peripheral edema absent.   Abdominal:      Palpations: Abdomen is soft.   Musculoskeletal: Normal range of motion.      Cervical back: Normal range of motion. Skin:     General: Skin is warm and dry.   Neurological:      Mental Status: Alert and oriented to person, place and time.           Assessment/Plan   There were no encounter diagnoses.  Medicare Wellness Billing Compliance Satisfied    *This is a visual tool to show completion of required items on the day of the visit. Green checks will only appear on the date of visit.    Review all medications by prescribing practitioner or clinical pharmacist (such as prescriptions, OTCs, herbal therapies and supplements) documented in the medical record    Past Medical, Surgical, and Family History reviewed and updated in chart    Tobacco Use Reviewed    Alcohol Use Reviewed    Illicit Drug Use Reviewed    PHQ2/9    Falls in Last Year Reviewed    Home Safety Risk Factors Reviewed    Cognitive Impairment Reviewed    Patient Self  Assessment and Health Status    Current Diet Reviewed    Exercise Frequency    ADL - Hearing Impairment    ADL - Bathing    ADL - Dressing    ADL - Walks in Home    IADL - Managing Finances    IADL - Grocery Shopping    IADL - Taking Medications    IADL - Doing Housework      #HTN   -BP stable  -Continue amlodipine and lisinopril, meds refilled     #HLD   #CAD s/p angioplasty   -LDL improved to 98  -Continue statin, aspirin, and plavix, meds refilled  -Follows with Dr. Madrigal cardiologist   - Labs at next appointment     #Hypercontractile esophagus   -Continue Lansoprazole, refilled today  -EGD completed 2023 no acute findings      #Vitamin D def  -Continue Vitamin D      #Hx of smoking  -quit in 2015, smoked 1 PPD>30 years  - Low dose CT showed stable lung nodule, will repeat in August 2024        Health maintenance   -Screen:   -Mammo done 2023 unremarkable.   -Dexa due- pt to schedule in spring  -PAP Not indicated; completed previously  -Low dose CT in 1 year- due in August 2024  -Colonoscopy UTD repeat in 2033  -Immunization: Discussed and reviewed   -Follow up in 4 months

## 2024-02-19 ENCOUNTER — OFFICE VISIT (OUTPATIENT)
Dept: CARDIOLOGY | Facility: CLINIC | Age: 64
End: 2024-02-19
Payer: COMMERCIAL

## 2024-02-19 VITALS
SYSTOLIC BLOOD PRESSURE: 101 MMHG | WEIGHT: 127 LBS | BODY MASS INDEX: 22.5 KG/M2 | HEART RATE: 87 BPM | HEIGHT: 63 IN | OXYGEN SATURATION: 97 % | DIASTOLIC BLOOD PRESSURE: 71 MMHG

## 2024-02-19 DIAGNOSIS — Z98.61 CAD S/P PERCUTANEOUS CORONARY ANGIOPLASTY: Primary | ICD-10-CM

## 2024-02-19 DIAGNOSIS — I25.10 CAD S/P PERCUTANEOUS CORONARY ANGIOPLASTY: Primary | ICD-10-CM

## 2024-02-19 DIAGNOSIS — E78.5 HYPERLIPIDEMIA, UNSPECIFIED HYPERLIPIDEMIA TYPE: ICD-10-CM

## 2024-02-19 DIAGNOSIS — I10 HYPERTENSION, UNSPECIFIED TYPE: ICD-10-CM

## 2024-02-19 PROCEDURE — 99213 OFFICE O/P EST LOW 20 MIN: CPT | Performed by: INTERNAL MEDICINE

## 2024-02-19 PROCEDURE — 1126F AMNT PAIN NOTED NONE PRSNT: CPT | Performed by: INTERNAL MEDICINE

## 2024-02-19 PROCEDURE — 3074F SYST BP LT 130 MM HG: CPT | Performed by: INTERNAL MEDICINE

## 2024-02-19 PROCEDURE — 3078F DIAST BP <80 MM HG: CPT | Performed by: INTERNAL MEDICINE

## 2024-02-19 PROCEDURE — 1160F RVW MEDS BY RX/DR IN RCRD: CPT | Performed by: INTERNAL MEDICINE

## 2024-02-19 PROCEDURE — 1159F MED LIST DOCD IN RCRD: CPT | Performed by: INTERNAL MEDICINE

## 2024-02-19 PROCEDURE — 1036F TOBACCO NON-USER: CPT | Performed by: INTERNAL MEDICINE

## 2024-02-19 RX ORDER — EZETIMIBE 10 MG/1
10 TABLET ORAL DAILY
Qty: 90 TABLET | Refills: 3 | Status: SHIPPED | OUTPATIENT
Start: 2024-02-19 | End: 2025-02-18

## 2024-02-19 ASSESSMENT — ENCOUNTER SYMPTOMS
FALLS: 0
DEPRESSION: 0
WHEEZING: 0
CONSTIPATION: 0
CHILLS: 0
ALTERED MENTAL STATUS: 0
LOSS OF SENSATION IN FEET: 0
MYALGIAS: 0
VOMITING: 0
MEMORY LOSS: 0
HEMATURIA: 0
COUGH: 0
HEMOPTYSIS: 0
BLOATING: 0
OCCASIONAL FEELINGS OF UNSTEADINESS: 0
ABDOMINAL PAIN: 0
FEVER: 0
NAUSEA: 0
DIARRHEA: 0
HEADACHES: 0
DYSURIA: 0

## 2024-02-19 ASSESSMENT — LIFESTYLE VARIABLES
HOW OFTEN DO YOU HAVE SIX OR MORE DRINKS ON ONE OCCASION: NEVER
HOW OFTEN DO YOU HAVE A DRINK CONTAINING ALCOHOL: NEVER
HOW MANY STANDARD DRINKS CONTAINING ALCOHOL DO YOU HAVE ON A TYPICAL DAY: PATIENT DOES NOT DRINK
AUDIT-C TOTAL SCORE: 0
SKIP TO QUESTIONS 9-10: 1

## 2024-02-19 ASSESSMENT — PAIN SCALES - GENERAL: PAINLEVEL: 0-NO PAIN

## 2024-02-19 NOTE — PROGRESS NOTES
Chief complaint:  Follow-up (1yr fu)     HPI  64 yo BF w/ h/o CAD s/p LAD stent 3/15, s/p LCx stents , s/p MI 3/15 (CP->L arm), HTN, HPL, TOB (quit 4/15) now here for cardiology f/u. She is feeling good. No chest pain. No dyspnea at rest. No INMAN. No orthopnea/PND. No palpitations. No LH/dizziness/syncope. No edema. No claudication. No cough. +long h/o occ R hand cold, sig reduced after Metoprolol changed to Amlodipine. +occ fatigue after work. +occ insomnia (nightmares on Melatonin). No snoring.  ECG 11/15: SB (58)  ECG : SR (86), ?LAE  Echo 3/15: nl EF  Cath 3/15: LM nl, pLAD 60% (FFR 0.83, IVUS with plaque rupture) [Promus Premier BEN], OM stents patent (40% ISR), RCA nl   CT chest : mod CAC, nl heart size, no peric eff, mild AA, no aneurysm, nl PA    Review of Systems   Constitutional: Negative for chills, fever and malaise/fatigue.   HENT:  Negative for hearing loss.    Eyes:  Negative for visual disturbance.   Respiratory:  Negative for cough, hemoptysis and wheezing.    Skin:  Negative for rash.   Musculoskeletal:  Negative for falls and myalgias.   Gastrointestinal:  Negative for bloating, abdominal pain, constipation, diarrhea, dysphagia, nausea and vomiting.   Genitourinary:  Negative for dysuria and hematuria.   Neurological:  Negative for headaches.   Psychiatric/Behavioral:  Negative for altered mental status, depression and memory loss.       Social History     Tobacco Use    Smoking status: Former     Packs/day: 1.00     Years: 30.00     Additional pack years: 0.00     Total pack years: 30.00     Types: Cigarettes     Quit date: 2015     Years since quittin.1    Smokeless tobacco: Never   Substance Use Topics    Alcohol use: Not Currently     Alcohol/week: 2.0 standard drinks of alcohol     Types: 2 Cans of beer per week     Comment: weekly      Family History   Problem Relation Name Age of Onset    Coronary artery disease Mother      No Known Problems Maternal Grandfather         Allergies   Allergen Reactions    Hydrocodone-Acetaminophen Itching    Morphine Itching    Oxycodone Itching      Current Outpatient Medications   Medication Instructions    amLODIPine (NORVASC) 2.5 mg, oral, Daily    aspirin 81 mg, oral, Daily    cholecalciferol (VITAMIN D-3) 50 mcg, oral, Daily    clopidogrel (PLAVIX) 75 mg, oral, Daily    lansoprazole (PREVACID) 30 mg, oral, Every morning    lisinopril 10 mg, oral, Daily    nitroglycerin (NITROSTAT) 0.4 mg, sublingual, Every 5 min PRN    rosuvastatin (CRESTOR) 40 mg, oral, Daily      Vitals:    02/19/24 0847   BP: 101/71   Pulse:    SpO2:      Physical Exam  Constitutional:       Appearance: Normal appearance.   HENT:      Head: Normocephalic and atraumatic.      Nose: Nose normal.   Neck:      Vascular: No carotid bruit.   Cardiovascular:      Rate and Rhythm: Normal rate and regular rhythm.      Heart sounds: No murmur heard.  Pulmonary:      Effort: Pulmonary effort is normal.      Breath sounds: Normal breath sounds.   Abdominal:      Palpations: Abdomen is soft.      Tenderness: There is no abdominal tenderness.   Musculoskeletal:      Right lower leg: No edema.      Left lower leg: No edema.   Skin:     General: Skin is warm and dry.   Neurological:      General: No focal deficit present.      Mental Status: She is alert.   Psychiatric:         Mood and Affect: Mood normal.         Judgment: Judgment normal.      Results/Data  8/23 Cr 0.7, K 4.7, LFT nl, LDL 98, HGB 14.1, pLT 320, hga1c 6.0, TSH 1.63  11/22 Cr 0.68, K 4.6, LFT nl, , HDL 80, TG 71, Chol 219, HGB 15, , hgba1c 6.0, TSH 1.79  7/22 LDL 99, HDL 77, TG 55, chol 187  2/22 Cr 0.99, K 4.5, LFT nl, , HDL 65, TG 49, Chol 187, HGB 14.4, , hgba1c 5.7, TSH 1.36 (no statin x 1 month)  4/19 LFT nl  3/19 Cr 0.58, K 4.0, LFTn l, LDL 99, HDL 72, TG 68, Chol 184, HGB 13.8, , hgba1c 5.5, TSH 1.36  5/18 Cr 0.72, K 4.0, HGB 13.6,   2/16 Cr 0.7, K 4.3, LFT nl, LDL 94, HDL  63, TG 77, Chol 172  3/15 Cr 0.8, K 4.1, Mg 2.0, LFT nl, , HDL 62, TG 77, Chol 228, HGB 14.2, , hgba1c 5.8, TSH 3.6, TPN 0.025, pBNP 182 (no statin)     Assessment/Plan   64 yo BF w/ h/o CAD s/p LAD stent 3/15, s/p LCx stents 9/11, s/p MI 3/15 (CP->L arm), HTN, HPL, TOB (quit 4/15). Doing well. She prefers to continue with current meds.  -continue ASA 81 qd (with food)  -continue Plavix 75 qd (she prefers to remain on)  -continue Amlodipine 2.5 qd (?vasospasm); she prefers to remain on; cold hand and fatigue on BB  -continue Lisinopril 10 qd  -continue Rosuva 40 qhs; add Zetia 10 qd -> goal LDL <70  -f/u 1 year (earlier if needed)     Toni Madrigal MD

## 2024-05-13 ENCOUNTER — APPOINTMENT (OUTPATIENT)
Dept: PRIMARY CARE | Facility: CLINIC | Age: 64
End: 2024-05-13
Payer: COMMERCIAL

## 2024-07-29 DIAGNOSIS — I25.10 CAD S/P PERCUTANEOUS CORONARY ANGIOPLASTY: ICD-10-CM

## 2024-07-29 DIAGNOSIS — Z98.61 CAD S/P PERCUTANEOUS CORONARY ANGIOPLASTY: ICD-10-CM

## 2024-07-29 DIAGNOSIS — K22.4 HYPERCONTRACTILE ESOPHAGUS: ICD-10-CM

## 2024-07-29 RX ORDER — LANSOPRAZOLE 30 MG/1
30 CAPSULE, DELAYED RELEASE ORAL EVERY MORNING
Qty: 90 CAPSULE | Refills: 0 | Status: SHIPPED | OUTPATIENT
Start: 2024-07-29

## 2024-07-29 RX ORDER — ASPIRIN 81 MG/1
81 TABLET ORAL DAILY
Qty: 90 TABLET | Refills: 0 | Status: SHIPPED | OUTPATIENT
Start: 2024-07-29

## 2024-08-05 DIAGNOSIS — I10 HYPERTENSION, UNSPECIFIED TYPE: ICD-10-CM

## 2024-08-05 DIAGNOSIS — E78.5 HYPERLIPIDEMIA, UNSPECIFIED HYPERLIPIDEMIA TYPE: ICD-10-CM

## 2024-08-05 RX ORDER — ROSUVASTATIN CALCIUM 40 MG/1
40 TABLET, COATED ORAL DAILY
Qty: 90 TABLET | Refills: 0 | Status: SHIPPED | OUTPATIENT
Start: 2024-08-05

## 2024-08-05 RX ORDER — LISINOPRIL 10 MG/1
10 TABLET ORAL DAILY
Qty: 90 TABLET | Refills: 0 | Status: SHIPPED | OUTPATIENT
Start: 2024-08-05

## 2024-09-05 ENCOUNTER — APPOINTMENT (OUTPATIENT)
Dept: PRIMARY CARE | Facility: CLINIC | Age: 64
End: 2024-09-05
Payer: COMMERCIAL

## 2024-09-09 ENCOUNTER — APPOINTMENT (OUTPATIENT)
Dept: PRIMARY CARE | Facility: CLINIC | Age: 64
End: 2024-09-09
Payer: COMMERCIAL

## 2024-09-09 VITALS
SYSTOLIC BLOOD PRESSURE: 110 MMHG | HEIGHT: 63 IN | WEIGHT: 129 LBS | HEART RATE: 64 BPM | BODY MASS INDEX: 22.86 KG/M2 | DIASTOLIC BLOOD PRESSURE: 80 MMHG

## 2024-09-09 DIAGNOSIS — R73.01 IFG (IMPAIRED FASTING GLUCOSE): ICD-10-CM

## 2024-09-09 DIAGNOSIS — Z12.31 ENCOUNTER FOR SCREENING MAMMOGRAM FOR MALIGNANT NEOPLASM OF BREAST: ICD-10-CM

## 2024-09-09 DIAGNOSIS — Z00.00 ROUTINE GENERAL MEDICAL EXAMINATION AT HEALTH CARE FACILITY: Primary | ICD-10-CM

## 2024-09-09 DIAGNOSIS — Z23 FLU VACCINE NEED: ICD-10-CM

## 2024-09-09 DIAGNOSIS — I10 HYPERTENSION, UNSPECIFIED TYPE: ICD-10-CM

## 2024-09-09 DIAGNOSIS — K22.4 HYPERCONTRACTILE ESOPHAGUS: ICD-10-CM

## 2024-09-09 DIAGNOSIS — E55.9 VITAMIN D DEFICIENCY: ICD-10-CM

## 2024-09-09 DIAGNOSIS — Z87.891 AGGRESSIVE EX-SMOKER: ICD-10-CM

## 2024-09-09 DIAGNOSIS — E78.5 HYPERLIPIDEMIA, UNSPECIFIED HYPERLIPIDEMIA TYPE: ICD-10-CM

## 2024-09-09 PROCEDURE — 1159F MED LIST DOCD IN RCRD: CPT | Performed by: INTERNAL MEDICINE

## 2024-09-09 PROCEDURE — 3079F DIAST BP 80-89 MM HG: CPT | Performed by: INTERNAL MEDICINE

## 2024-09-09 PROCEDURE — 3008F BODY MASS INDEX DOCD: CPT | Performed by: INTERNAL MEDICINE

## 2024-09-09 PROCEDURE — 1036F TOBACCO NON-USER: CPT | Performed by: INTERNAL MEDICINE

## 2024-09-09 PROCEDURE — 99214 OFFICE O/P EST MOD 30 MIN: CPT | Performed by: INTERNAL MEDICINE

## 2024-09-09 PROCEDURE — 1124F ACP DISCUSS-NO DSCNMKR DOCD: CPT | Performed by: INTERNAL MEDICINE

## 2024-09-09 PROCEDURE — 1170F FXNL STATUS ASSESSED: CPT | Performed by: INTERNAL MEDICINE

## 2024-09-09 PROCEDURE — 1160F RVW MEDS BY RX/DR IN RCRD: CPT | Performed by: INTERNAL MEDICINE

## 2024-09-09 PROCEDURE — 3074F SYST BP LT 130 MM HG: CPT | Performed by: INTERNAL MEDICINE

## 2024-09-09 PROCEDURE — G0008 ADMIN INFLUENZA VIRUS VAC: HCPCS | Performed by: INTERNAL MEDICINE

## 2024-09-09 PROCEDURE — 90662 IIV NO PRSV INCREASED AG IM: CPT | Performed by: INTERNAL MEDICINE

## 2024-09-09 ASSESSMENT — ENCOUNTER SYMPTOMS
OCCASIONAL FEELINGS OF UNSTEADINESS: 0
FATIGUE: 0
DEPRESSION: 0
LOSS OF SENSATION IN FEET: 0
SHORTNESS OF BREATH: 0
ABDOMINAL PAIN: 0
FEVER: 0
DIFFICULTY URINATING: 0

## 2024-09-09 ASSESSMENT — ACTIVITIES OF DAILY LIVING (ADL)
GROCERY_SHOPPING: INDEPENDENT
MANAGING_FINANCES: INDEPENDENT
DRESSING: INDEPENDENT
TAKING_MEDICATION: INDEPENDENT
BATHING: INDEPENDENT
DOING_HOUSEWORK: INDEPENDENT

## 2024-09-09 ASSESSMENT — PATIENT HEALTH QUESTIONNAIRE - PHQ9
SUM OF ALL RESPONSES TO PHQ9 QUESTIONS 1 AND 2: 2
10. IF YOU CHECKED OFF ANY PROBLEMS, HOW DIFFICULT HAVE THESE PROBLEMS MADE IT FOR YOU TO DO YOUR WORK, TAKE CARE OF THINGS AT HOME, OR GET ALONG WITH OTHER PEOPLE: NOT DIFFICULT AT ALL
2. FEELING DOWN, DEPRESSED OR HOPELESS: SEVERAL DAYS
1. LITTLE INTEREST OR PLEASURE IN DOING THINGS: SEVERAL DAYS

## 2024-09-09 NOTE — PROGRESS NOTES
Subjective   Reason for Visit: Marquise Gaona is an 64 y.o. female here for a Medicare Wellness visit.     Past Medical, Surgical, and Family History reviewed and updated in chart.    Reviewed all medications by prescribing practitioner or clinical pharmacist (such as prescriptions, OTCs, herbal therapies and supplements) and documented in the medical record.    HPI  She is complaining of night sweats which has been going on for the past 2 months but otherwise doing well. She has been compliant with all her medications and received the flu vaccine and have instructed her to get the RSV and TDAP vaccines from the pharmacy. She has regular appetite and normal BM.     Patient Care Team:  Manuela No MD as PCP - General     Review of Systems   Constitutional:  Negative for fatigue and fever.   Respiratory:  Negative for shortness of breath.    Cardiovascular:  Negative for chest pain.   Gastrointestinal:  Negative for abdominal pain.   Genitourinary:  Negative for difficulty urinating.       Objective   Vitals:  /80 (BP Location: Left arm, Patient Position: Sitting)   Pulse 64       Physical Exam  Vitals and nursing note reviewed.   HENT:      Head: Normocephalic.      Nose: Nose normal.      Mouth/Throat:      Mouth: Mucous membranes are moist.   Eyes:      Pupils: Pupils are equal, round, and reactive to light.   Cardiovascular:      Rate and Rhythm: Normal rate and regular rhythm.      Pulses: Normal pulses.      Heart sounds: Normal heart sounds.   Pulmonary:      Effort: Pulmonary effort is normal.   Abdominal:      General: Bowel sounds are normal.      Palpations: Abdomen is soft.   Skin:     General: Skin is warm and dry.   Neurological:      General: No focal deficit present.      Mental Status: She is alert.         Assessment & Plan  Routine general medical examination at health care facility    Orders:    BI mammo bilateral screening tomosynthesis; Future     Aggressive  ex-smoker    Orders:    CT lung screening low dose; Future    Encounter for screening mammogram for malignant neoplasm of breast    Orders:    BI mammo bilateral screening tomosynthesis; Future    #HTN  #HLD  #T2DM  - CBC  - CMP  - Lipid panel  - Urine albumin creatinine  - HbA1c    She received the flu vaccine and was interested in receiving the RSV and TDAP vaccines. She was instructed to get the vaccines from her pharmacy.       Medicare Wellness Billing Compliance Satisfied    *This is a visual tool to show completion of required items on the day of the visit. Green checks will only appear on the date of visit.    Review all medications by prescribing practitioner or clinical pharmacist (such as prescriptions, OTCs, herbal therapies and supplements) documented in the medical record    Past Medical, Surgical, and Family History reviewed and updated in chart    Tobacco Use Reviewed    Alcohol Use Reviewed    Illicit Drug Use Reviewed    PHQ2/9    Falls in Last Year Reviewed    Home Safety Risk Factors Reviewed    Cognitive Impairment Reviewed    Patient Self Assessment and Health Status    Current Diet Reviewed    Exercise Frequency    ADL - Hearing Impairment    ADL - Bathing    ADL - Dressing    ADL - Walks in Home    IADL - Managing Finances    IADL - Grocery Shopping    IADL - Taking Medications    IADL - Doing Housework

## 2024-09-10 ENCOUNTER — LAB (OUTPATIENT)
Dept: LAB | Facility: LAB | Age: 64
End: 2024-09-10
Payer: COMMERCIAL

## 2024-09-10 DIAGNOSIS — I10 HYPERTENSION, UNSPECIFIED TYPE: ICD-10-CM

## 2024-09-10 DIAGNOSIS — E55.9 VITAMIN D DEFICIENCY: ICD-10-CM

## 2024-09-10 DIAGNOSIS — R79.89 ELEVATED LFTS: Primary | ICD-10-CM

## 2024-09-10 DIAGNOSIS — R73.01 IFG (IMPAIRED FASTING GLUCOSE): ICD-10-CM

## 2024-09-10 DIAGNOSIS — E78.5 HYPERLIPIDEMIA, UNSPECIFIED HYPERLIPIDEMIA TYPE: ICD-10-CM

## 2024-09-10 LAB
25(OH)D3 SERPL-MCNC: 65 NG/ML (ref 30–100)
ALBUMIN SERPL BCP-MCNC: 4.4 G/DL (ref 3.4–5)
ALP SERPL-CCNC: 103 U/L (ref 33–136)
ALT SERPL W P-5'-P-CCNC: 111 U/L (ref 7–45)
ANION GAP SERPL CALC-SCNC: 12 MMOL/L (ref 10–20)
AST SERPL W P-5'-P-CCNC: 70 U/L (ref 9–39)
BILIRUB SERPL-MCNC: 1.1 MG/DL (ref 0–1.2)
BUN SERPL-MCNC: 18 MG/DL (ref 6–23)
CALCIUM SERPL-MCNC: 9.8 MG/DL (ref 8.6–10.6)
CHLORIDE SERPL-SCNC: 100 MMOL/L (ref 98–107)
CHOLEST SERPL-MCNC: 183 MG/DL (ref 0–199)
CHOLESTEROL/HDL RATIO: 2.5
CO2 SERPL-SCNC: 29 MMOL/L (ref 21–32)
CREAT SERPL-MCNC: 0.75 MG/DL (ref 0.5–1.05)
CREAT UR-MCNC: 125.7 MG/DL (ref 20–320)
EGFRCR SERPLBLD CKD-EPI 2021: 88 ML/MIN/1.73M*2
ERYTHROCYTE [DISTWIDTH] IN BLOOD BY AUTOMATED COUNT: 16.4 % (ref 11.5–14.5)
EST. AVERAGE GLUCOSE BLD GHB EST-MCNC: 123 MG/DL
GLUCOSE SERPL-MCNC: 112 MG/DL (ref 74–99)
HBA1C MFR BLD: 5.9 %
HCT VFR BLD AUTO: 41.9 % (ref 36–46)
HDLC SERPL-MCNC: 73 MG/DL
HGB BLD-MCNC: 13.6 G/DL (ref 12–16)
LDLC SERPL CALC-MCNC: 100 MG/DL
MCH RBC QN AUTO: 27 PG (ref 26–34)
MCHC RBC AUTO-ENTMCNC: 32.5 G/DL (ref 32–36)
MCV RBC AUTO: 83 FL (ref 80–100)
MICROALBUMIN UR-MCNC: 9.2 MG/L
MICROALBUMIN/CREAT UR: 7.3 UG/MG CREAT
NON HDL CHOLESTEROL: 110 MG/DL (ref 0–149)
NRBC BLD-RTO: 0 /100 WBCS (ref 0–0)
PLATELET # BLD AUTO: 300 X10*3/UL (ref 150–450)
POTASSIUM SERPL-SCNC: 4.9 MMOL/L (ref 3.5–5.3)
PROT SERPL-MCNC: 7.3 G/DL (ref 6.4–8.2)
RBC # BLD AUTO: 5.04 X10*6/UL (ref 4–5.2)
SODIUM SERPL-SCNC: 136 MMOL/L (ref 136–145)
TRIGL SERPL-MCNC: 48 MG/DL (ref 0–149)
TSH SERPL-ACNC: 1.78 MIU/L (ref 0.44–3.98)
VLDL: 10 MG/DL (ref 0–40)
WBC # BLD AUTO: 8.5 X10*3/UL (ref 4.4–11.3)

## 2024-09-10 PROCEDURE — 82570 ASSAY OF URINE CREATININE: CPT

## 2024-09-10 PROCEDURE — 83036 HEMOGLOBIN GLYCOSYLATED A1C: CPT

## 2024-09-10 PROCEDURE — 80061 LIPID PANEL: CPT

## 2024-09-10 PROCEDURE — 82306 VITAMIN D 25 HYDROXY: CPT

## 2024-09-10 PROCEDURE — 85027 COMPLETE CBC AUTOMATED: CPT

## 2024-09-10 PROCEDURE — 80053 COMPREHEN METABOLIC PANEL: CPT

## 2024-09-10 PROCEDURE — 84443 ASSAY THYROID STIM HORMONE: CPT

## 2024-09-10 PROCEDURE — 36415 COLL VENOUS BLD VENIPUNCTURE: CPT

## 2024-09-10 PROCEDURE — 82043 UR ALBUMIN QUANTITATIVE: CPT

## 2024-09-10 NOTE — RESULT ENCOUNTER NOTE
Please stop taking Rosuvastatin and repeat blood work in 4 weeks to evaluate your liver functions.  The order is in.

## 2024-09-24 ENCOUNTER — HOSPITAL ENCOUNTER (OUTPATIENT)
Dept: RADIOLOGY | Facility: CLINIC | Age: 64
Discharge: HOME | End: 2024-09-24
Payer: COMMERCIAL

## 2024-09-24 VITALS — HEIGHT: 63 IN | BODY MASS INDEX: 22.85 KG/M2 | WEIGHT: 128.97 LBS

## 2024-09-24 DIAGNOSIS — Z00.00 ROUTINE GENERAL MEDICAL EXAMINATION AT HEALTH CARE FACILITY: ICD-10-CM

## 2024-09-24 DIAGNOSIS — Z12.31 ENCOUNTER FOR SCREENING MAMMOGRAM FOR MALIGNANT NEOPLASM OF BREAST: ICD-10-CM

## 2024-09-24 PROCEDURE — 77063 BREAST TOMOSYNTHESIS BI: CPT | Performed by: RADIOLOGY

## 2024-09-24 PROCEDURE — 77067 SCR MAMMO BI INCL CAD: CPT | Performed by: RADIOLOGY

## 2024-09-24 PROCEDURE — 77067 SCR MAMMO BI INCL CAD: CPT

## 2024-10-28 DIAGNOSIS — K22.4 HYPERCONTRACTILE ESOPHAGUS: ICD-10-CM

## 2024-10-28 RX ORDER — LANSOPRAZOLE 30 MG/1
30 CAPSULE, DELAYED RELEASE ORAL EVERY MORNING
Qty: 90 CAPSULE | Refills: 0 | Status: SHIPPED | OUTPATIENT
Start: 2024-10-28

## 2024-11-11 ENCOUNTER — APPOINTMENT (OUTPATIENT)
Dept: PRIMARY CARE | Facility: CLINIC | Age: 64
End: 2024-11-11
Payer: COMMERCIAL

## 2024-11-13 ENCOUNTER — OFFICE VISIT (OUTPATIENT)
Dept: PRIMARY CARE | Facility: CLINIC | Age: 64
End: 2024-11-13
Payer: COMMERCIAL

## 2024-11-13 VITALS
WEIGHT: 127 LBS | HEIGHT: 63 IN | SYSTOLIC BLOOD PRESSURE: 125 MMHG | BODY MASS INDEX: 22.5 KG/M2 | DIASTOLIC BLOOD PRESSURE: 79 MMHG | HEART RATE: 96 BPM

## 2024-11-13 DIAGNOSIS — I25.10 CAD S/P PERCUTANEOUS CORONARY ANGIOPLASTY: ICD-10-CM

## 2024-11-13 DIAGNOSIS — K22.4 HYPERCONTRACTILE ESOPHAGUS: ICD-10-CM

## 2024-11-13 DIAGNOSIS — E78.5 HYPERLIPIDEMIA, UNSPECIFIED HYPERLIPIDEMIA TYPE: ICD-10-CM

## 2024-11-13 DIAGNOSIS — Z98.61 CAD S/P PERCUTANEOUS CORONARY ANGIOPLASTY: ICD-10-CM

## 2024-11-13 DIAGNOSIS — I10 HYPERTENSION, UNSPECIFIED TYPE: Primary | ICD-10-CM

## 2024-11-13 DIAGNOSIS — Z00.00 ANNUAL PHYSICAL EXAM: ICD-10-CM

## 2024-11-13 DIAGNOSIS — Z78.0 POST-MENOPAUSAL: ICD-10-CM

## 2024-11-13 PROCEDURE — 3008F BODY MASS INDEX DOCD: CPT | Performed by: INTERNAL MEDICINE

## 2024-11-13 PROCEDURE — 99214 OFFICE O/P EST MOD 30 MIN: CPT | Performed by: INTERNAL MEDICINE

## 2024-11-13 PROCEDURE — 1036F TOBACCO NON-USER: CPT | Performed by: INTERNAL MEDICINE

## 2024-11-13 PROCEDURE — 3078F DIAST BP <80 MM HG: CPT | Performed by: INTERNAL MEDICINE

## 2024-11-13 PROCEDURE — G2211 COMPLEX E/M VISIT ADD ON: HCPCS | Performed by: INTERNAL MEDICINE

## 2024-11-13 PROCEDURE — 3074F SYST BP LT 130 MM HG: CPT | Performed by: INTERNAL MEDICINE

## 2024-11-13 RX ORDER — ASPIRIN 81 MG/1
81 TABLET ORAL DAILY
Qty: 90 TABLET | Refills: 0 | Status: SHIPPED | OUTPATIENT
Start: 2024-11-13

## 2024-11-13 RX ORDER — ATENOLOL 25 MG/1
25 TABLET ORAL DAILY
Qty: 90 TABLET | Refills: 1 | Status: SHIPPED | OUTPATIENT
Start: 2024-11-13 | End: 2025-11-13

## 2024-11-13 RX ORDER — EZETIMIBE 10 MG/1
10 TABLET ORAL DAILY
Qty: 90 TABLET | Refills: 3 | Status: SHIPPED | OUTPATIENT
Start: 2024-11-13 | End: 2025-11-13

## 2024-11-13 RX ORDER — ROSUVASTATIN CALCIUM 40 MG/1
40 TABLET, COATED ORAL DAILY
Qty: 90 TABLET | Refills: 0 | Status: SHIPPED | OUTPATIENT
Start: 2024-11-13

## 2024-11-13 RX ORDER — LISINOPRIL 10 MG/1
10 TABLET ORAL DAILY
Qty: 90 TABLET | Refills: 0 | Status: SHIPPED | OUTPATIENT
Start: 2024-11-13

## 2024-11-13 ASSESSMENT — LIFESTYLE VARIABLES
HOW MANY STANDARD DRINKS CONTAINING ALCOHOL DO YOU HAVE ON A TYPICAL DAY: 1 OR 2
HOW OFTEN DO YOU HAVE A DRINK CONTAINING ALCOHOL: 2-4 TIMES A MONTH
AUDIT-C TOTAL SCORE: 2
SKIP TO QUESTIONS 9-10: 1
HOW OFTEN DO YOU HAVE SIX OR MORE DRINKS ON ONE OCCASION: NEVER

## 2024-11-13 ASSESSMENT — ENCOUNTER SYMPTOMS
GASTROINTESTINAL NEGATIVE: 1
CARDIOVASCULAR NEGATIVE: 1
CONSTITUTIONAL NEGATIVE: 1
RESPIRATORY NEGATIVE: 1

## 2024-11-13 ASSESSMENT — PATIENT HEALTH QUESTIONNAIRE - PHQ9
2. FEELING DOWN, DEPRESSED OR HOPELESS: NOT AT ALL
SUM OF ALL RESPONSES TO PHQ9 QUESTIONS 1 AND 2: 0
1. LITTLE INTEREST OR PLEASURE IN DOING THINGS: NOT AT ALL

## 2024-11-13 NOTE — PATIENT INSTRUCTIONS
Heart Healthy Tips:     -We recommend you follow a heart healthy diet. Watch food labels and try not to  eat more than 2,500 mg of sodium per day. Avoid foods high in salt like  processed meats (lunch meats, felder, and sausage), processed foods (boxed  dinners, canned soups), fried and fast foods. Monitor serving sizes and if the  sodium per serving size is more than 200 mg, avoid those foods. If the sodium  per serving size is between 100-200 mg, you can use those in limited quantities.  Try to choose foods where the amount of sodium per serving size is less than 100  mg. Try to eat a diet rich in fruits and vegetables, whole grains, low fat dairy  products, skinless poultry and fish, nuts, beans, non-tropical vegetable oils.  Limit saturated fat, trans fat, sodium, red meats, and sugar-sweetened  beverages. Limit alcohol    -The combination of a reduced-calorie diet and increased physical activity is  recommended. Adults should aim to get at least 150 minutes of moderate physical  activity per week (30 minutes of moderate physical activities at least 5 days  per week). Examples of moderate physical activities include brisk walking,  swimming, aerobic dancing, heavy gardening, jumping rope, bicycling 10 MPH or  faster, tennis, hiking uphill or with a heavy backpack. Please let us know if  you would like to learn more about your nutrition and calories and additional  options including weight loss programs to help you reach your goal.     -If you smoke, stop smoking. If you stop smoking you can help get rid of a major  source of stress to your heart. Smoking makes your heart rate and blood pressure  go up and increases your risk or developing cardiovascular diseases and worsen  symptoms associated with heart failure.     -Obtain a BP monitor and monitor your BP daily. Check it around the same time  each day; at least 1 hour after taking your medications. Record your BP in a log  and bring your log with you to your  doctor?s appointment.

## 2024-11-13 NOTE — PROGRESS NOTES
Chief Complaint:   Chief Complaint   Patient presents with    Follow-up     Flu shot completed        Marquise Gaona is a 64 y.o. year old female is here for follow-up.   HPI   Marquise Gaona is here for follow up on chronic conditions.  Reports no new issues.  Compliant with medications.  Denies side effects.  Needs refills on medications.  Chart reviewed, pharmacy updated, prior notes, labs, imaging, EKGs reviewed.    Past Medical History   Past Medical History:   Diagnosis Date    Acute vaginitis 10/19/2017    Acute vaginitis    Body mass index (BMI) 21.0-21.9, adult 10/02/2019    BMI 21.0-21.9, adult    Body mass index (BMI) 22.0-22.9, adult 10/19/2021    BMI 22.0-22.9, adult    Body mass index (BMI) 23.0-23.9, adult 02/15/2022    BMI 23.0-23.9, adult    Body mass index (BMI) 24.0-24.9, adult 07/08/2022    BMI 24.0-24.9, adult    Body mass index (BMI) 31.0-31.9, adult 07/06/2021    BMI 31.0-31.9,adult    Fibrocystic breast 1984    Old myocardial infarction 11/13/2015    History of ST elevation myocardial infarction (STEMI)    Personal history of other diseases of the circulatory system     History of crescendo angina      Past Surgical History:   Past Surgical History:   Procedure Laterality Date    APPENDECTOMY  11/13/2015    Appendectomy    BREAST BIOPSY Left     HYSTERECTOMY  03/07/2016    Hysterectomy     Family History:   Family History   Problem Relation Name Age of Onset    Coronary artery disease Mother      No Known Problems Maternal Grandfather       Social History:   Tobacco Use: Medium Risk (11/13/2024)    Patient History     Smoking Tobacco Use: Former     Smokeless Tobacco Use: Never     Passive Exposure: Never      Social History     Substance and Sexual Activity   Alcohol Use Not Currently    Alcohol/week: 2.0 standard drinks of alcohol    Types: 2 Cans of beer per week    Comment: weekly        Allergies:   Allergies   Allergen Reactions    Hydrocodone-Acetaminophen Itching    Morphine Itching  "   Oxycodone Itching        ROS   Review of Systems   Constitutional: Negative.    Respiratory: Negative.     Cardiovascular: Negative.    Gastrointestinal: Negative.         Objective   Vitals:    11/13/24 1024   BP: 125/79   Pulse: 96      BMI Readings from Last 15 Encounters:   11/13/24 22.50 kg/m²   09/24/24 22.85 kg/m²   09/09/24 22.85 kg/m²   02/19/24 22.50 kg/m²   01/17/24 24.51 kg/m²   11/29/23 23.05 kg/m²   08/11/23 22.86 kg/m²   02/20/23 22.94 kg/m²   12/20/22 22.83 kg/m²   11/04/22 24.02 kg/m²   08/29/22 23.63 kg/m²   07/08/22 24.41 kg/m²   02/21/22 23.64 kg/m²   02/15/22 21.62 kg/m²   10/19/21 20.38 kg/m²      57.6 kg (127 lb) (11/13/2024 10:24 AM)      Physical Exam  Physical Exam  Constitutional:       Appearance: She is well-developed.   Cardiovascular:      Rate and Rhythm: Normal rate and regular rhythm.      Heart sounds: Normal heart sounds. No murmur heard.  Pulmonary:      Effort: Pulmonary effort is normal.      Breath sounds: Normal breath sounds.   Abdominal:      General: Bowel sounds are normal.      Palpations: Abdomen is soft.          Labs:  CBC:  Recent Labs     09/10/24  0943 08/19/23  0923 11/04/22  1115   WBC 8.5 9.5 9.3   HGB 13.6 14.1 15.0   HCT 41.9 45.1 46.3*    320 342   MCV 83 87 89     CMP:  Recent Labs     09/10/24  0943 08/19/23  0923 11/04/22  1115    139 139   K 4.9 4.7 4.6    103 102   CO2 29 25 25   ANIONGAP 12 16 17   BUN 18 10 9   CREATININE 0.75 0.70 0.68   EGFR 88  --   --    GLUCOSE 112* 100* 92     Recent Labs     09/10/24  0943 08/19/23  0923 11/04/22  1115   ALBUMIN 4.4 4.6 5.0   ALKPHOS 103 82 84   * 29 32   AST 70* 26 31   BILITOT 1.1 0.7 1.0     Calcium/Phos:   Lab Results   Component Value Date    CALCIUM 9.8 09/10/2024      COAG:   Recent Labs     05/03/18  0932   INR 1.0     CRP: No results found for: \"CRP\"   [unfilled]   ENDO:  Recent Labs     09/10/24  0943 08/19/23  0923 11/04/22  1115 07/09/22  0920   TSH 1.78 1.63 1.79 " "1.77   HGBA1C 5.9* 6.0* 6.0* 5.8*      CARDIAC: No results for input(s): \"LDH\", \"CKMB\", \"TROPHS\", \"BNP\" in the last 16239 hours.    No lab exists for component: \"CK\", \"CKMBP\"  Recent Labs     09/10/24  0943 08/19/23  0923 11/04/22  1115 07/09/22  0920   CHOL 183 178 219* 187   LDLF  --  98 125* 99   LDLCALC 100*  --   --   --    HDL 73.0 68.7 79.6 77.1   TRIG 48 57 71 55     No data recorded    Current Medications:  Current Outpatient Medications   Medication Sig Dispense Refill    lansoprazole (Prevacid) 30 mg DR capsule Take 1 capsule (30 mg) by mouth once daily in the morning. 90 capsule 0    aspirin 81 mg EC tablet Take 1 tablet (81 mg) by mouth once daily. 90 tablet 0    atenolol (Tenormin) 25 mg tablet Take 1 tablet (25 mg) by mouth once daily. 90 tablet 1    ezetimibe (Zetia) 10 mg tablet Take 1 tablet (10 mg) by mouth once daily. 90 tablet 3    lisinopril 10 mg tablet Take 1 tablet (10 mg) by mouth once daily. 90 tablet 0    rosuvastatin (Crestor) 40 mg tablet Take 1 tablet (40 mg) by mouth once daily. 90 tablet 0     No current facility-administered medications for this visit.       Assessment and Plan  Marquise was seen today for follow-up.  Diagnoses and all orders for this visit:  Hypertension, unspecified type (Primary)  -     atenolol (Tenormin) 25 mg tablet; Take 1 tablet (25 mg) by mouth once daily.  -     lisinopril 10 mg tablet; Take 1 tablet (10 mg) by mouth once daily.  CAD S/P percutaneous coronary angioplasty  -     atenolol (Tenormin) 25 mg tablet; Take 1 tablet (25 mg) by mouth once daily.  -     aspirin 81 mg EC tablet; Take 1 tablet (81 mg) by mouth once daily.  -     ezetimibe (Zetia) 10 mg tablet; Take 1 tablet (10 mg) by mouth once daily.  Hyperlipidemia, unspecified hyperlipidemia type  -     ezetimibe (Zetia) 10 mg tablet; Take 1 tablet (10 mg) by mouth once daily.  -     rosuvastatin (Crestor) 40 mg tablet; Take 1 tablet (40 mg) by mouth once daily.  Hypercontractile esophagus  Annual " physical exam  -     XR DEXA bone density; Future  Post-menopausal  -     XR DEXA bone density; Future     Relative tachycardia on exam  Stop Amlodipine  Start Atenolol 25 mg daily  Even though she was taken off Metoprolol due to numbness in her hand, her numbness and cold sensation continues to bother her.  Let us know of any issues with Atenolol.  Continue with statins and Zetia.  Due for CT lung screening, she will schedule it in the spring  Same as DEXA  Repeat complete BW at the next visit      By optimizing your health through good nutrition, daily exercise, stress management, low/moderate alcohol and avoidance of tobacco, sugar and processed foods, you can help to decrease your risk of cardiovascular disease and stroke and achieve a healthy weight. A diet rich in whole, plant-based foods such as vegetables, beans, seeds, nuts, whole grains, healthy fats and fruit - leads to lower body mass index, blood pressure, HbA1C, and cholesterol levels.         Immunizations:  Immunization History   Administered Date(s) Administered    Flu vaccine, quadrivalent, high-dose, preservative free, age 65y+ (FLUZONE) 11/29/2023    Flu vaccine, trivalent, preservative free, HIGH-DOSE, age 65y+ (Fluzone) 09/09/2024    Pfizer COVID-19 vaccine, bivalent, age 12 years and older (30 mcg/0.3 mL) 12/15/2022    Pfizer Gray Cap SARS-CoV-2 02/01/2022    Pfizer Purple Cap SARS-CoV-2 08/02/2021, 08/23/2021

## 2025-02-03 DIAGNOSIS — K22.4 HYPERCONTRACTILE ESOPHAGUS: ICD-10-CM

## 2025-02-03 RX ORDER — LANSOPRAZOLE 30 MG/1
30 CAPSULE, DELAYED RELEASE ORAL EVERY MORNING
Qty: 90 CAPSULE | Refills: 0 | Status: SHIPPED | OUTPATIENT
Start: 2025-02-03

## 2025-02-14 ENCOUNTER — APPOINTMENT (OUTPATIENT)
Dept: PRIMARY CARE | Facility: CLINIC | Age: 65
End: 2025-02-14
Payer: COMMERCIAL

## 2025-02-14 DIAGNOSIS — K22.4 HYPERCONTRACTILE ESOPHAGUS: ICD-10-CM

## 2025-02-14 DIAGNOSIS — I10 HYPERTENSION, UNSPECIFIED TYPE: ICD-10-CM

## 2025-02-14 DIAGNOSIS — Z98.61 CAD S/P PERCUTANEOUS CORONARY ANGIOPLASTY: ICD-10-CM

## 2025-02-14 DIAGNOSIS — E78.5 HYPERLIPIDEMIA, UNSPECIFIED HYPERLIPIDEMIA TYPE: ICD-10-CM

## 2025-02-14 DIAGNOSIS — I25.10 CAD S/P PERCUTANEOUS CORONARY ANGIOPLASTY: ICD-10-CM

## 2025-02-14 PROCEDURE — 99214 OFFICE O/P EST MOD 30 MIN: CPT | Performed by: INTERNAL MEDICINE

## 2025-02-14 RX ORDER — EZETIMIBE 10 MG/1
10 TABLET ORAL DAILY
Qty: 90 TABLET | Refills: 3 | Status: SHIPPED | OUTPATIENT
Start: 2025-02-14 | End: 2026-02-14

## 2025-02-14 RX ORDER — ROSUVASTATIN CALCIUM 40 MG/1
40 TABLET, COATED ORAL DAILY
Qty: 90 TABLET | Refills: 0 | Status: SHIPPED | OUTPATIENT
Start: 2025-02-14

## 2025-02-14 RX ORDER — ATENOLOL 25 MG/1
25 TABLET ORAL DAILY
Qty: 90 TABLET | Refills: 1 | Status: SHIPPED | OUTPATIENT
Start: 2025-02-14 | End: 2026-02-14

## 2025-02-14 RX ORDER — LANSOPRAZOLE 30 MG/1
30 CAPSULE, DELAYED RELEASE ORAL EVERY MORNING
Qty: 90 CAPSULE | Refills: 0 | Status: SHIPPED | OUTPATIENT
Start: 2025-02-14

## 2025-02-14 RX ORDER — ASPIRIN 81 MG/1
81 TABLET ORAL DAILY
Qty: 90 TABLET | Refills: 0 | Status: SHIPPED | OUTPATIENT
Start: 2025-02-14

## 2025-02-14 RX ORDER — LISINOPRIL 10 MG/1
10 TABLET ORAL DAILY
Qty: 90 TABLET | Refills: 0 | Status: SHIPPED | OUTPATIENT
Start: 2025-02-14

## 2025-02-14 ASSESSMENT — ENCOUNTER SYMPTOMS
ABDOMINAL PAIN: 0
SHORTNESS OF BREATH: 0

## 2025-02-14 NOTE — PROGRESS NOTES
"Subjective   Patient ID: Marquise Gaona is a 64 y.o. female who presents for a virtual follow up visit     HPI   63 y/o FM with PMHx of HTN, HLD, GERD who presents for a virtual follow up visit. Pt states she is doing well today. She reports she is waiting for spring time to complete DEXA and CT chest for lung screening. Pt explains she is following a healthy diet including baked chicken, vegetables, plenty of H20. Pt reports she is working out at home using a \"paddling\" machine, keeping her active at home.  In regards to GERD, pt reports she experiences burning sensation at times. She states she is compliant with Prevacid and taking as prescribed. She explains she has dinner about 1-2 hours before bed time and doesn't elevate her head while asleep. Pt denies any tobacco or alcohol use.     Review of Systems   Respiratory:  Negative for shortness of breath.    Cardiovascular:  Negative for chest pain and leg swelling.   Gastrointestinal:  Negative for abdominal pain.   All other systems reviewed and are negative.      Objective   LMP  (LMP Unknown)     Physical Exam  Vitals and nursing note reviewed.   Constitutional:       Appearance: Normal appearance.   Pulmonary:      Effort: Pulmonary effort is normal.   Neurological:      Mental Status: She is alert.   Psychiatric:         Mood and Affect: Mood normal.         Behavior: Behavior normal.         Assessment/Plan   Problem List Items Addressed This Visit       CAD S/P percutaneous coronary angioplasty    Relevant Medications    aspirin 81 mg EC tablet    atenolol (Tenormin) 25 mg tablet    ezetimibe (Zetia) 10 mg tablet    Hypercontractile esophagus    Relevant Medications    lansoprazole (Prevacid) 30 mg DR capsule    Hyperlipidemia    Relevant Medications    ezetimibe (Zetia) 10 mg tablet    rosuvastatin (Crestor) 40 mg tablet    Hypertension    Relevant Medications    atenolol (Tenormin) 25 mg tablet    lisinopril 10 mg tablet    Other Relevant Orders    " Comprehensive metabolic panel    Albumin-Creatinine Ratio, Urine Random     63 y/o FM with PMHx of HTN, HLD, GERD who presents for a virtual follow up visit.    #HTN  - Pt reports she is compliant with Lisinopril and atenolol at home  - Advised pt to continue home Lisinopril and Atenolol as prescribed  - CMP, urine albumin ordered today  - Refills on Lisinopril and atenolol provided today  - TM     #GERD  - Pt reports she is compliant with Prevacid 30mg daily  - Pt reports burning sensation occurs at times despite taking medication   - Discussed refraining from eating dinner right before bed, elevating head during sleep and importance of avoiding intake of spicy foods.    - WCTM     Health Maintenance   - Refills on Aspirin, Ezetimibe, Lansoprazole, Lisinopril, Rosuvastatin      Rajendra Majano DO  Internal Medicine, PGY-1     An interactive audio and video telecommunication system which permits real time communications between the patient (at the originating site) and provider (at the distant site) was utilized to provide this telehealth service.    Verbal consent was requested and obtained from the patient on the day of encounter.  This is a virtual visit using HIPAA compliant video platform. It required patient-provider interaction for the medical decision making as documented.     I have communicated my name and active licensure. The patient's identity and physical location were verified at the time of this visit.   Either the patient or their legal representative has been informed of the risks and benefits of -- and alternatives to -- treatment through a remote evaluation and consents to proceed with the evaluation remotely.

## 2025-02-14 NOTE — PROGRESS NOTES
I reviewed the resident/fellow's documentation and discussed the patient with the resident/fellow. I agree with the resident/fellow's medical decision making as documented in the note.  As the attending physician,  I reviewed the relevant imaging studies and available reports. I also discussed the differential diagnosis and all of the proposed management plans with the resident.    Manuela No MD

## 2025-02-21 ENCOUNTER — OFFICE VISIT (OUTPATIENT)
Dept: CARDIOLOGY | Facility: CLINIC | Age: 65
End: 2025-02-21
Payer: COMMERCIAL

## 2025-02-21 VITALS
OXYGEN SATURATION: 96 % | BODY MASS INDEX: 22.16 KG/M2 | DIASTOLIC BLOOD PRESSURE: 74 MMHG | SYSTOLIC BLOOD PRESSURE: 115 MMHG | WEIGHT: 125.06 LBS | HEART RATE: 83 BPM | HEIGHT: 63 IN

## 2025-02-21 DIAGNOSIS — E78.5 HYPERLIPIDEMIA, UNSPECIFIED HYPERLIPIDEMIA TYPE: ICD-10-CM

## 2025-02-21 DIAGNOSIS — I10 HYPERTENSION, UNSPECIFIED TYPE: ICD-10-CM

## 2025-02-21 DIAGNOSIS — I25.10 CAD S/P PERCUTANEOUS CORONARY ANGIOPLASTY: Primary | ICD-10-CM

## 2025-02-21 DIAGNOSIS — Z98.61 CAD S/P PERCUTANEOUS CORONARY ANGIOPLASTY: Primary | ICD-10-CM

## 2025-02-21 LAB
ATRIAL RATE: 81 BPM
P AXIS: 63 DEGREES
P OFFSET: 195 MS
P ONSET: 142 MS
PR INTERVAL: 164 MS
Q ONSET: 224 MS
QRS COUNT: 13 BEATS
QRS DURATION: 78 MS
QT INTERVAL: 372 MS
QTC CALCULATION(BAZETT): 432 MS
QTC FREDERICIA: 411 MS
R AXIS: 66 DEGREES
T AXIS: 47 DEGREES
T OFFSET: 410 MS
VENTRICULAR RATE: 81 BPM

## 2025-02-21 PROCEDURE — 3008F BODY MASS INDEX DOCD: CPT | Performed by: INTERNAL MEDICINE

## 2025-02-21 PROCEDURE — 99213 OFFICE O/P EST LOW 20 MIN: CPT | Performed by: INTERNAL MEDICINE

## 2025-02-21 PROCEDURE — G2211 COMPLEX E/M VISIT ADD ON: HCPCS | Performed by: INTERNAL MEDICINE

## 2025-02-21 PROCEDURE — 1125F AMNT PAIN NOTED PAIN PRSNT: CPT | Performed by: INTERNAL MEDICINE

## 2025-02-21 PROCEDURE — 3078F DIAST BP <80 MM HG: CPT | Performed by: INTERNAL MEDICINE

## 2025-02-21 PROCEDURE — 1036F TOBACCO NON-USER: CPT | Performed by: INTERNAL MEDICINE

## 2025-02-21 PROCEDURE — 3074F SYST BP LT 130 MM HG: CPT | Performed by: INTERNAL MEDICINE

## 2025-02-21 PROCEDURE — 1159F MED LIST DOCD IN RCRD: CPT | Performed by: INTERNAL MEDICINE

## 2025-02-21 PROCEDURE — 93005 ELECTROCARDIOGRAM TRACING: CPT | Performed by: INTERNAL MEDICINE

## 2025-02-21 ASSESSMENT — ENCOUNTER SYMPTOMS
NAUSEA: 0
HEADACHES: 0
CHILLS: 0
DIARRHEA: 0
WHEEZING: 0
MEMORY LOSS: 0
FEVER: 0
CONSTIPATION: 0
ALTERED MENTAL STATUS: 0
DYSURIA: 0
MYALGIAS: 0
HEMATURIA: 0
FALLS: 0
DEPRESSION: 0
OCCASIONAL FEELINGS OF UNSTEADINESS: 0
LOSS OF SENSATION IN FEET: 0
VOMITING: 0
BLOATING: 0
ABDOMINAL PAIN: 0
COUGH: 0
HEMOPTYSIS: 0

## 2025-02-21 ASSESSMENT — COLUMBIA-SUICIDE SEVERITY RATING SCALE - C-SSRS
6. HAVE YOU EVER DONE ANYTHING, STARTED TO DO ANYTHING, OR PREPARED TO DO ANYTHING TO END YOUR LIFE?: NO
1. IN THE PAST MONTH, HAVE YOU WISHED YOU WERE DEAD OR WISHED YOU COULD GO TO SLEEP AND NOT WAKE UP?: NO
2. HAVE YOU ACTUALLY HAD ANY THOUGHTS OF KILLING YOURSELF?: NO

## 2025-02-21 ASSESSMENT — PAIN SCALES - GENERAL: PAINLEVEL_OUTOF10: 5

## 2025-02-21 NOTE — PROGRESS NOTES
Chief Complaint   Patient presents with    Follow-up    Coronary Artery Disease    Hyperlipidemia    Hypertension       HPI  63 yo BF w/ h/o CAD s/p LAD stent 3/15, s/p LCx stents 9/11, s/p MI 3/15 (CP->L arm), HTN, HPL, TOB (quit 4/15) now here for cardiology f/u. She is feeling good. No chest pain. No dyspnea at rest. No INMAN. No orthopnea/PND. No palpitations. No LH/dizziness/syncope. No edema. No claudication. No cough. +long h/o occ R hand cold, prev reduced after Metoprolol changed to Amlodipine.   ECG 11/15: SB (58)  ECG 12/22: SR (86), ?LAE  ECG 2/25: SR (81), normal  Echo 3/15: nl EF  Cath 3/15: LM nl, pLAD 60% (FFR 0.83, IVUS with plaque rupture) [Promus Premier BEN], OM stents patent (40% ISR), RCA nl   CT chest 8/23: mod CAC, nl heart size, no peric eff, mild AA, no aneurysm, nl PA    Review of Systems   Constitutional: Negative for chills, fever and malaise/fatigue.   HENT:  Negative for hearing loss.    Eyes:  Negative for visual disturbance.   Respiratory:  Negative for cough, hemoptysis and wheezing.    Skin:  Negative for rash.   Musculoskeletal:  Negative for falls and myalgias.   Gastrointestinal:  Negative for bloating, abdominal pain, constipation, diarrhea, dysphagia, nausea and vomiting.   Genitourinary:  Negative for dysuria and hematuria.   Neurological:  Negative for headaches.   Psychiatric/Behavioral:  Negative for altered mental status, depression and memory loss.       Social History     Tobacco Use    Smoking status: Former     Current packs/day: 0.00     Average packs/day: 1 pack/day for 30.0 years (30.0 ttl pk-yrs)     Types: Cigarettes     Start date: 1/1/1985     Quit date: 1/1/2015     Years since quitting: 10.1     Passive exposure: Never    Smokeless tobacco: Never   Substance Use Topics    Alcohol use: Not Currently     Alcohol/week: 2.0 standard drinks of alcohol     Types: 2 Cans of beer per week     Comment: weekly      Family History   Problem Relation Name Age of Onset     Coronary artery disease Mother      No Known Problems Maternal Grandfather        Allergies   Allergen Reactions    Hydrocodone-Acetaminophen Itching    Morphine Itching    Oxycodone Itching      Current Outpatient Medications   Medication Instructions    aspirin 81 mg, oral, Daily    atenolol (TENORMIN) 25 mg, oral, Daily    ezetimibe (ZETIA) 10 mg, oral, Daily    lansoprazole (PREVACID) 30 mg, oral, Every morning    lisinopril 10 mg, oral, Daily    rosuvastatin (CRESTOR) 40 mg, oral, Daily      Vitals:    02/21/25 0914   BP: 115/74   Pulse: 83   SpO2: 96%     Physical Exam  Constitutional:       Appearance: Normal appearance.   HENT:      Head: Normocephalic and atraumatic.      Nose: Nose normal.   Neck:      Vascular: No carotid bruit.   Cardiovascular:      Rate and Rhythm: Normal rate and regular rhythm.      Heart sounds: No murmur heard.  Pulmonary:      Effort: Pulmonary effort is normal.      Breath sounds: Normal breath sounds.   Abdominal:      Palpations: Abdomen is soft.      Tenderness: There is no abdominal tenderness.   Musculoskeletal:      Right lower leg: No edema.      Left lower leg: No edema.   Skin:     General: Skin is warm and dry.   Neurological:      General: No focal deficit present.      Mental Status: She is alert.   Psychiatric:         Mood and Affect: Mood normal.         Judgment: Judgment normal.        Results/Data  9/24 Cr 0.75, , HDL 73, TG 48, Chol 183, HGB 13.6, , hgba1c 5.9, TSH 1.78 (Rosuva 40, Zetia 10)  8/23 Cr 0.7, K 4.7, LFT nl, LDL 98, HGB 14.1, pLT 320, hga1c 6.0, TSH 1.63  11/22 Cr 0.68, K 4.6, LFT nl, , HDL 80, TG 71, Chol 219, HGB 15, , hgba1c 6.0, TSH 1.79  7/22 LDL 99, HDL 77, TG 55, chol 187  2/22 Cr 0.99, K 4.5, LFT nl, , HDL 65, TG 49, Chol 187, HGB 14.4, , hgba1c 5.7, TSH 1.36 (no statin x 1 month)  4/19 LFT nl  3/19 Cr 0.58, K 4.0, LFT nl, LDL 99, HDL 72, TG 68, Chol 184, HGB 13.8, , hgba1c 5.5, TSH 1.36  5/18  Cr 0.72, K 4.0, HGB 13.6,   2/16 Cr 0.7, K 4.3, LFT nl, LDL 94, HDL 63, TG 77, Chol 172  3/15 Cr 0.8, K 4.1, Mg 2.0, LFT nl, , HDL 62, TG 77, Chol 228, HGB 14.2, , hgba1c 5.8, TSH 3.6, TPN 0.025, pBNP 182 (no statin)     Assessment/Plan   63 yo BF w/ h/o CAD s/p LAD stent 3/15, s/p LCx stents 9/11, s/p MI 3/15 (CP->L arm), HTN, HPL, TOB (quit 4/15). Doing well.   -continue ASA 81 every day  -continue Atenolol 25 every day (consider change back to Amlodipine since prev helped cold hand/fatigue)  -continue Lisinopril 10 qd  -continue Rosuva 40 qhs; add Zetia 10 qd -> goal LDL <70  -f/u 1 year (earlier if needed)     Toni Madrigal MD

## 2025-02-22 LAB
ALBUMIN SERPL-MCNC: 4.4 G/DL (ref 3.6–5.1)
ALP SERPL-CCNC: 116 U/L (ref 37–153)
ALT SERPL-CCNC: 72 U/L (ref 6–29)
ANION GAP SERPL CALCULATED.4IONS-SCNC: 6 MMOL/L (CALC) (ref 7–17)
AST SERPL-CCNC: 41 U/L (ref 10–35)
BILIRUB SERPL-MCNC: 0.8 MG/DL (ref 0.2–1.2)
BUN SERPL-MCNC: 9 MG/DL (ref 7–25)
CALCIUM SERPL-MCNC: 9.8 MG/DL (ref 8.6–10.4)
CHLORIDE SERPL-SCNC: 105 MMOL/L (ref 98–110)
CO2 SERPL-SCNC: 28 MMOL/L (ref 20–32)
CREAT SERPL-MCNC: 0.64 MG/DL (ref 0.5–1.05)
EGFRCR SERPLBLD CKD-EPI 2021: 97 ML/MIN/1.73M2
GLUCOSE SERPL-MCNC: 82 MG/DL (ref 65–99)
POTASSIUM SERPL-SCNC: 4.2 MMOL/L (ref 3.5–5.3)
PROT SERPL-MCNC: 7.1 G/DL (ref 6.1–8.1)
SODIUM SERPL-SCNC: 139 MMOL/L (ref 135–146)

## 2025-02-24 LAB
ALBUMIN/CREAT UR: NORMAL MG/G CREAT
CREAT UR-MCNC: 105 MG/DL (ref 20–275)
MICROALBUMIN UR-MCNC: <0.2 MG/DL

## 2025-04-24 ENCOUNTER — HOSPITAL ENCOUNTER (OUTPATIENT)
Dept: RADIOLOGY | Facility: CLINIC | Age: 65
Discharge: HOME | End: 2025-04-24
Payer: COMMERCIAL

## 2025-04-24 DIAGNOSIS — Z00.00 ANNUAL PHYSICAL EXAM: ICD-10-CM

## 2025-04-24 DIAGNOSIS — Z78.0 POST-MENOPAUSAL: ICD-10-CM

## 2025-04-24 PROCEDURE — 77080 DXA BONE DENSITY AXIAL: CPT

## 2025-04-24 PROCEDURE — 77080 DXA BONE DENSITY AXIAL: CPT | Performed by: RADIOLOGY

## 2025-04-24 NOTE — RESULT ENCOUNTER NOTE
Hi, your bone scan showed low bone mass.    What can you do to keep your bones as healthy as possible?  You can:  Eat foods with a lot of calcium, such as milk, yogurt, and green leafy vegetables   Eat foods with a lot of vitamin D, such as milk that has vitamin D added, and fish from the ocean  Take calcium and vitamin D pills (if you do not get enough from the food that you eat)  Be active for at least 30 minutes, most days of the week  Avoid smoking  Limit the amount of alcohol you drink to 1 to 2 drinks a day at most  What else can you do to avoid fractures?  It sounds simple, but you can prevent a lot of fractures by reducing the chances of a fall. To do that:  Make sure all your rugs have a no-slip backing to keep them in place  Tuck away any electrical cords, so they are not in your way  Light all walkways well  Watch out for slippery floors  Wear sturdy, comfortable shoes with rubber soles  Have your eyes checked  Ask your doctor or nurse to check whether any of your medicines might make you dizzy or increase your risk of falling

## 2025-06-05 ENCOUNTER — APPOINTMENT (OUTPATIENT)
Dept: PRIMARY CARE | Facility: CLINIC | Age: 65
End: 2025-06-05
Payer: COMMERCIAL

## 2025-06-05 VITALS
SYSTOLIC BLOOD PRESSURE: 135 MMHG | HEART RATE: 62 BPM | BODY MASS INDEX: 22.5 KG/M2 | WEIGHT: 127 LBS | HEIGHT: 63 IN | DIASTOLIC BLOOD PRESSURE: 77 MMHG

## 2025-06-05 DIAGNOSIS — K22.4 HYPERCONTRACTILE ESOPHAGUS: ICD-10-CM

## 2025-06-05 DIAGNOSIS — I10 HYPERTENSION, UNSPECIFIED TYPE: ICD-10-CM

## 2025-06-05 DIAGNOSIS — E78.5 HYPERLIPIDEMIA, UNSPECIFIED HYPERLIPIDEMIA TYPE: ICD-10-CM

## 2025-06-05 DIAGNOSIS — R73.01 IFG (IMPAIRED FASTING GLUCOSE): ICD-10-CM

## 2025-06-05 DIAGNOSIS — E55.9 VITAMIN D DEFICIENCY: ICD-10-CM

## 2025-06-05 DIAGNOSIS — M25.531 RIGHT WRIST PAIN: ICD-10-CM

## 2025-06-05 DIAGNOSIS — Z98.61 CAD S/P PERCUTANEOUS CORONARY ANGIOPLASTY: Primary | ICD-10-CM

## 2025-06-05 DIAGNOSIS — I25.10 CAD S/P PERCUTANEOUS CORONARY ANGIOPLASTY: Primary | ICD-10-CM

## 2025-06-05 DIAGNOSIS — M89.8X4: ICD-10-CM

## 2025-06-05 DIAGNOSIS — M89.8X3 BONE PAIN OF WRIST: ICD-10-CM

## 2025-06-05 RX ORDER — LANSOPRAZOLE 30 MG/1
30 CAPSULE, DELAYED RELEASE ORAL EVERY MORNING
Qty: 90 CAPSULE | Refills: 0 | Status: SHIPPED | OUTPATIENT
Start: 2025-06-05

## 2025-06-05 ASSESSMENT — ACTIVITIES OF DAILY LIVING (ADL)
BATHING: INDEPENDENT
GROCERY_SHOPPING: INDEPENDENT
MANAGING_FINANCES: INDEPENDENT
DOING_HOUSEWORK: INDEPENDENT
TAKING_MEDICATION: INDEPENDENT
DRESSING: INDEPENDENT

## 2025-06-05 ASSESSMENT — PATIENT HEALTH QUESTIONNAIRE - PHQ9
SUM OF ALL RESPONSES TO PHQ9 QUESTIONS 1 AND 2: 0
1. LITTLE INTEREST OR PLEASURE IN DOING THINGS: NOT AT ALL
2. FEELING DOWN, DEPRESSED OR HOPELESS: NOT AT ALL
SUM OF ALL RESPONSES TO PHQ9 QUESTIONS 1 AND 2: 0
1. LITTLE INTEREST OR PLEASURE IN DOING THINGS: NOT AT ALL
2. FEELING DOWN, DEPRESSED OR HOPELESS: NOT AT ALL

## 2025-06-05 ASSESSMENT — LIFESTYLE VARIABLES
AUDIT-C TOTAL SCORE: 2
SKIP TO QUESTIONS 9-10: 1
HOW OFTEN DO YOU HAVE SIX OR MORE DRINKS ON ONE OCCASION: NEVER
HOW OFTEN DO YOU HAVE A DRINK CONTAINING ALCOHOL: 2-4 TIMES A MONTH
HOW MANY STANDARD DRINKS CONTAINING ALCOHOL DO YOU HAVE ON A TYPICAL DAY: 1 OR 2

## 2025-06-05 NOTE — PROGRESS NOTES
Chief Complaint:   Chief Complaint   Patient presents with    Medicare Annual Wellness Visit Initial      HPI    The patient is being seen for the subsequent annual wellness visit and follow up.  Past Medical, Surgical and Family History: reviewed and updated in chart.   Interval History: Patient has not been hospitalized previously.   Medications and Supplements: Review of all medications by a prescribing practitioner or clinical pharmacist (such as prescriptions, OTCs, herbal therapies and supplements) documented in the medical record.    No, the patient is not using opioids.   Health Risk Assessment:. Paper HRA completed by patient and scanned into chart.   Depression/Suicide Screening:  .   Done  No falls in the past year.  No recent hospitalizations.  Advance care planning completed.     Past Medical History   Medical History[1]   Past Surgical History:   Surgical History[2]  Family History:   Family History[3]  Social History:   Tobacco Use: Medium Risk (6/5/2025)    Patient History     Smoking Tobacco Use: Former     Smokeless Tobacco Use: Never     Passive Exposure: Never      Social History     Substance and Sexual Activity   Alcohol Use Not Currently    Alcohol/week: 2.0 standard drinks of alcohol    Types: 2 Cans of beer per week    Comment: weekly        Allergies:   RX Allergies[4]     ROS   Review of Systems     Objective   Vitals:    06/05/25 1111   BP: 135/77   Pulse: 62      BMI Readings from Last 15 Encounters:   06/05/25 22.50 kg/m²   02/21/25 22.15 kg/m²   11/13/24 22.50 kg/m²   09/24/24 22.85 kg/m²   09/09/24 22.85 kg/m²   02/19/24 22.50 kg/m²   01/17/24 24.51 kg/m²   11/29/23 23.05 kg/m²   08/11/23 22.86 kg/m²   02/20/23 22.94 kg/m²   12/20/22 22.83 kg/m²   11/04/22 24.02 kg/m²   08/29/22 23.63 kg/m²   07/08/22 24.41 kg/m²   02/21/22 23.64 kg/m²      57.6 kg (127 lb) (6/5/2025 11:11 AM)      Physical Exam  Physical Exam   Tenderness in the R medial wrist aspect with some bony prominence  "palpable  No erythema  No increased warmth      Labs:  CBC:  Recent Labs     09/10/24  0943 08/19/23  0923 11/04/22  1115   WBC 8.5 9.5 9.3   HGB 13.6 14.1 15.0   HCT 41.9 45.1 46.3*    320 342   MCV 83 87 89     CMP:  Recent Labs     02/21/25  0948 09/10/24  0943 08/19/23  0923    136 139   K 4.2 4.9 4.7    100 103   CO2 28 29 25   ANIONGAP 6* 12 16   BUN 9 18 10   CREATININE 0.64 0.75 0.70   EGFR 97 88  --    GLUCOSE 82 112* 100*     Recent Labs     02/21/25  0948 09/10/24  0943 08/19/23  0923   ALBUMIN 4.4 4.4 4.6   ALKPHOS 116 103 82   ALT 72* 111* 29   AST 41* 70* 26   BILITOT 0.8 1.1 0.7     Calcium/Phos:   Lab Results   Component Value Date    CALCIUM 9.8 02/21/2025      COAG:   Recent Labs     05/03/18  0932   INR 1.0     CRP: No results found for: \"CRP\"   [unfilled]   ENDO:  Recent Labs     09/10/24  0943 08/19/23  0923 11/04/22  1115 07/09/22  0920   TSH 1.78 1.63 1.79 1.77   HGBA1C 5.9* 6.0* 6.0* 5.8*      CARDIAC: No results for input(s): \"LDH\", \"CKMB\", \"TROPHS\", \"BNP\" in the last 64665 hours.    No lab exists for component: \"CK\", \"CKMBP\"  Recent Labs     09/10/24  0943 08/19/23  0923 11/04/22  1115 07/09/22  0920   CHOL 183 178 219* 187   LDLF  --  98 125* 99   LDLCALC 100*  --   --   --    HDL 73.0 68.7 79.6 77.1   TRIG 48 57 71 55     No data recorded    Current Medications:  Current Medications[5]    Assessment and Plan  Marquise was seen today for medicare annual wellness visit initial.  Diagnoses and all orders for this visit:  CAD S/P percutaneous coronary angioplasty (Primary)  -     CBC; Future  -     Comprehensive Metabolic Panel; Future  -     TSH with reflex to Free T4 if abnormal; Future  -     Hemoglobin A1C; Future  -     CBC  -     Comprehensive Metabolic Panel  -     TSH with reflex to Free T4 if abnormal  -     Hemoglobin A1C  Bone pain of hand  Bone pain of wrist  -     XR wrist right 3+ views; Future  -     Uric acid; Future  -     Uric acid  Right wrist pain  -  "    XR wrist right 3+ views; Future  -     Uric acid; Future  -     Uric acid  Hypercontractile esophagus  -     lansoprazole (Prevacid) 30 mg DR capsule; Take 1 capsule (30 mg) by mouth once daily in the morning.  Hyperlipidemia, unspecified hyperlipidemia type  -     CBC; Future  -     Comprehensive Metabolic Panel; Future  -     TSH with reflex to Free T4 if abnormal; Future  -     Hemoglobin A1C; Future  -     Lipid Panel; Future  -     CBC  -     Comprehensive Metabolic Panel  -     TSH with reflex to Free T4 if abnormal  -     Hemoglobin A1C  -     Lipid Panel  Vitamin D deficiency  -     Vitamin D 25-Hydroxy,Total (for eval of Vitamin D levels); Future  -     Vitamin D 25-Hydroxy,Total (for eval of Vitamin D levels)  Hypertension, unspecified type  -     CBC; Future  -     Comprehensive Metabolic Panel; Future  -     TSH with reflex to Free T4 if abnormal; Future  -     Hemoglobin A1C; Future  -     Lipid Panel; Future  -     CBC  -     Comprehensive Metabolic Panel  -     TSH with reflex to Free T4 if abnormal  -     Hemoglobin A1C  -     Lipid Panel  IFG (impaired fasting glucose)  -     Hemoglobin A1C; Future  -     Hemoglobin A1C     R anterior wrist pain  Swelling and tenderness on exam  Getting Xray  Check uric acid level       HTN.  Well controlled.  Continue with current medications.  Check BP at home daily.  Report to us if the numbers are higher than 130/80.    The patient and I have had an extensive discussion regarding the importance of blood pressure management and control as well as the importance of following up with us, taking medications appropriately and following up on medical advice.   Patient expressed understanding and acknowledges importance of compliance.     CAD  Seeing a cardiologist regularly  Repeat BW      Immunizations:  Immunization History   Administered Date(s) Administered    COVID-19, mRNA, LNP-S, PF, 30 mcg/0.3 mL dose 08/02/2021, 08/23/2021    Flu vaccine, quadrivalent,  high-dose, preservative free, age 65y+ (FLUZONE) 11/29/2023    Flu vaccine, trivalent, preservative free, HIGH-DOSE, age 65y+ (Fluzone) 09/09/2024    Pfizer COVID-19 vaccine, bivalent, age 12 years and older (30 mcg/0.3 mL) 12/15/2022    Pfizer Gray Cap SARS-CoV-2 02/01/2022         Medicare Wellness Billing Compliance Satisfied    *This is a visual tool to show completion of required items on the day of the visit. Green checks will only appear on the date of visit.    Review all medications by prescribing practitioner or clinical pharmacist (such as prescriptions, OTCs, herbal therapies and supplements) documented in the medical record    Past Medical, Surgical, and Family History reviewed and updated in chart    Tobacco Use Reviewed    Alcohol Use Reviewed    Illicit Drug Use Reviewed    PHQ2/9    Falls in Last Year Reviewed    Home Safety Risk Factors Reviewed    Cognitive Impairment Reviewed    Patient Self Assessment and Health Status    Current Diet Reviewed    Exercise Frequency    ADL - Hearing Impairment    ADL - Bathing    ADL - Dressing    ADL - Walks in Home    IADL - Managing Finances    IADL - Grocery Shopping    IADL - Taking Medications    IADL - Doing Housework    Vision Screening            [1]   Past Medical History:  Diagnosis Date    Acute vaginitis 10/19/2017    Acute vaginitis    Body mass index (BMI) 21.0-21.9, adult 10/02/2019    BMI 21.0-21.9, adult    Body mass index (BMI) 22.0-22.9, adult 10/19/2021    BMI 22.0-22.9, adult    Body mass index (BMI) 23.0-23.9, adult 02/15/2022    BMI 23.0-23.9, adult    Body mass index (BMI) 24.0-24.9, adult 07/08/2022    BMI 24.0-24.9, adult    Body mass index (BMI) 31.0-31.9, adult 07/06/2021    BMI 31.0-31.9,adult    Fibrocystic breast 1984    Old myocardial infarction 11/13/2015    History of ST elevation myocardial infarction (STEMI)    Personal history of other diseases of the circulatory system     History of crescendo angina    [2]   Past Surgical History:  Procedure Laterality Date    APPENDECTOMY  11/13/2015    Appendectomy    BREAST BIOPSY Left     HYSTERECTOMY  03/07/2016    Hysterectomy   [3]   Family History  Problem Relation Name Age of Onset    Coronary artery disease Mother      No Known Problems Maternal Grandfather     [4]   Allergies  Allergen Reactions    Hydrocodone-Acetaminophen Itching    Morphine Itching    Oxycodone Itching   [5]   Current Outpatient Medications   Medication Sig Dispense Refill    aspirin 81 mg EC tablet Take 1 tablet (81 mg) by mouth once daily. 90 tablet 0    atenolol (Tenormin) 25 mg tablet Take 1 tablet (25 mg) by mouth once daily. 90 tablet 1    ezetimibe (Zetia) 10 mg tablet Take 1 tablet (10 mg) by mouth once daily. 90 tablet 3    lisinopril 10 mg tablet Take 1 tablet (10 mg) by mouth once daily. 90 tablet 0    rosuvastatin (Crestor) 40 mg tablet Take 1 tablet (40 mg) by mouth once daily. 90 tablet 0    lansoprazole (Prevacid) 30 mg DR capsule Take 1 capsule (30 mg) by mouth once daily in the morning. 90 capsule 0     No current facility-administered medications for this visit.

## 2025-06-06 LAB
25(OH)D3+25(OH)D2 SERPL-MCNC: 47 NG/ML (ref 30–100)
ALBUMIN SERPL-MCNC: 4 G/DL (ref 3.6–5.1)
ALP SERPL-CCNC: 77 U/L (ref 37–153)
ALT SERPL-CCNC: 22 U/L (ref 6–29)
ANION GAP SERPL CALCULATED.4IONS-SCNC: 10 MMOL/L (CALC) (ref 7–17)
AST SERPL-CCNC: 25 U/L (ref 10–35)
BILIRUB SERPL-MCNC: 0.7 MG/DL (ref 0.2–1.2)
BUN SERPL-MCNC: 9 MG/DL (ref 7–25)
CALCIUM SERPL-MCNC: 9.5 MG/DL (ref 8.6–10.4)
CHLORIDE SERPL-SCNC: 106 MMOL/L (ref 98–110)
CHOLEST SERPL-MCNC: 276 MG/DL
CHOLEST/HDLC SERPL: 3.3 (CALC)
CO2 SERPL-SCNC: 24 MMOL/L (ref 20–32)
CREAT SERPL-MCNC: 0.7 MG/DL (ref 0.5–1.05)
EGFRCR SERPLBLD CKD-EPI 2021: 95 ML/MIN/1.73M2
ERYTHROCYTE [DISTWIDTH] IN BLOOD BY AUTOMATED COUNT: 15.5 % (ref 11–15)
EST. AVERAGE GLUCOSE BLD GHB EST-MCNC: 128 MG/DL
EST. AVERAGE GLUCOSE BLD GHB EST-SCNC: 7.1 MMOL/L
GLUCOSE SERPL-MCNC: 86 MG/DL (ref 65–99)
HBA1C MFR BLD: 6.1 %
HCT VFR BLD AUTO: 43.7 % (ref 35–45)
HDLC SERPL-MCNC: 83 MG/DL
HGB BLD-MCNC: 13.4 G/DL (ref 11.7–15.5)
LDLC SERPL CALC-MCNC: 175 MG/DL (CALC)
MCH RBC QN AUTO: 27.6 PG (ref 27–33)
MCHC RBC AUTO-ENTMCNC: 30.7 G/DL (ref 32–36)
MCV RBC AUTO: 89.9 FL (ref 80–100)
NONHDLC SERPL-MCNC: 193 MG/DL (CALC)
PLATELET # BLD AUTO: 315 THOUSAND/UL (ref 140–400)
PMV BLD REES-ECKER: 9.5 FL (ref 7.5–12.5)
POTASSIUM SERPL-SCNC: 4.5 MMOL/L (ref 3.5–5.3)
PROT SERPL-MCNC: 6.9 G/DL (ref 6.1–8.1)
RBC # BLD AUTO: 4.86 MILLION/UL (ref 3.8–5.1)
SODIUM SERPL-SCNC: 140 MMOL/L (ref 135–146)
TRIGL SERPL-MCNC: 79 MG/DL
TSH SERPL-ACNC: 3.83 MIU/L (ref 0.4–4.5)
URATE SERPL-MCNC: 5.4 MG/DL (ref 2.5–7)
WBC # BLD AUTO: 7.5 THOUSAND/UL (ref 3.8–10.8)

## 2025-06-09 ENCOUNTER — HOSPITAL ENCOUNTER (OUTPATIENT)
Dept: RADIOLOGY | Facility: HOSPITAL | Age: 65
Discharge: HOME | End: 2025-06-09
Payer: COMMERCIAL

## 2025-06-09 DIAGNOSIS — M89.8X3 BONE PAIN OF WRIST: ICD-10-CM

## 2025-06-09 DIAGNOSIS — M25.531 RIGHT WRIST PAIN: ICD-10-CM

## 2025-06-09 PROCEDURE — 73110 X-RAY EXAM OF WRIST: CPT | Mod: RT

## 2025-06-09 PROCEDURE — 73110 X-RAY EXAM OF WRIST: CPT | Mod: RIGHT SIDE | Performed by: RADIOLOGY

## 2025-06-11 NOTE — RESULT ENCOUNTER NOTE
Results reviewed.  There are some minor abnormalities, which are not concerning.  No changes in medications are needed at this time.  Please continue with current treatment.    Best,  Dr. Roy

## 2025-06-12 DIAGNOSIS — E78.5 HYPERLIPIDEMIA, UNSPECIFIED HYPERLIPIDEMIA TYPE: ICD-10-CM

## 2025-06-12 DIAGNOSIS — I10 HYPERTENSION, UNSPECIFIED TYPE: ICD-10-CM

## 2025-06-12 RX ORDER — LISINOPRIL 10 MG/1
10 TABLET ORAL DAILY
Qty: 90 TABLET | Refills: 0 | Status: SHIPPED | OUTPATIENT
Start: 2025-06-12

## 2025-06-12 RX ORDER — ROSUVASTATIN CALCIUM 40 MG/1
40 TABLET, COATED ORAL DAILY
Qty: 90 TABLET | Refills: 0 | Status: SHIPPED | OUTPATIENT
Start: 2025-06-12

## 2025-08-25 DIAGNOSIS — K22.4 HYPERCONTRACTILE ESOPHAGUS: ICD-10-CM

## 2025-08-25 RX ORDER — LANSOPRAZOLE 30 MG/1
30 CAPSULE, DELAYED RELEASE ORAL EVERY MORNING
Qty: 90 CAPSULE | Refills: 0 | Status: SHIPPED | OUTPATIENT
Start: 2025-08-25

## 2025-09-10 ENCOUNTER — APPOINTMENT (OUTPATIENT)
Dept: PRIMARY CARE | Facility: CLINIC | Age: 65
End: 2025-09-10
Payer: COMMERCIAL